# Patient Record
Sex: MALE | Race: WHITE | NOT HISPANIC OR LATINO | Employment: OTHER | URBAN - METROPOLITAN AREA
[De-identification: names, ages, dates, MRNs, and addresses within clinical notes are randomized per-mention and may not be internally consistent; named-entity substitution may affect disease eponyms.]

---

## 2018-01-02 ENCOUNTER — RECORDS - HEALTHEAST (OUTPATIENT)
Dept: LAB | Facility: CLINIC | Age: 63
End: 2018-01-02

## 2018-01-02 LAB
ANION GAP SERPL CALCULATED.3IONS-SCNC: 13 MMOL/L (ref 5–18)
BUN SERPL-MCNC: 61 MG/DL (ref 8–22)
CALCIUM SERPL-MCNC: 9.5 MG/DL (ref 8.5–10.5)
CHLORIDE BLD-SCNC: 100 MMOL/L (ref 98–107)
CO2 SERPL-SCNC: 21 MMOL/L (ref 22–31)
CREAT SERPL-MCNC: 2.08 MG/DL (ref 0.7–1.3)
GFR SERPL CREATININE-BSD FRML MDRD: 33 ML/MIN/1.73M2
GLUCOSE BLD-MCNC: 203 MG/DL (ref 70–125)
INR PPP: 2.09 (ref 0.9–1.1)
POTASSIUM BLD-SCNC: 4.4 MMOL/L (ref 3.5–5)
SODIUM SERPL-SCNC: 134 MMOL/L (ref 136–145)

## 2018-01-04 ENCOUNTER — RECORDS - HEALTHEAST (OUTPATIENT)
Dept: LAB | Facility: CLINIC | Age: 63
End: 2018-01-04

## 2018-01-04 LAB — INR PPP: 2.39 (ref 0.9–1.1)

## 2018-01-05 ENCOUNTER — RECORDS - HEALTHEAST (OUTPATIENT)
Dept: LAB | Facility: CLINIC | Age: 63
End: 2018-01-05

## 2018-01-08 LAB — INR PPP: 2.76 (ref 0.9–1.1)

## 2021-11-13 ENCOUNTER — TRANSFERRED RECORDS (OUTPATIENT)
Dept: HEALTH INFORMATION MANAGEMENT | Facility: CLINIC | Age: 66
End: 2021-11-13

## 2021-11-13 ENCOUNTER — HOSPITAL ENCOUNTER (EMERGENCY)
Facility: CLINIC | Age: 66
Discharge: HOME | End: 2021-11-13
Attending: EMERGENCY MEDICINE | Admitting: EMERGENCY MEDICINE
Payer: COMMERCIAL

## 2021-11-13 VITALS
SYSTOLIC BLOOD PRESSURE: 133 MMHG | DIASTOLIC BLOOD PRESSURE: 76 MMHG | OXYGEN SATURATION: 96 % | TEMPERATURE: 97.9 F | RESPIRATION RATE: 18 BRPM | HEART RATE: 87 BPM

## 2021-11-13 DIAGNOSIS — H43.11 VITREOUS HEMORRHAGE OF RIGHT EYE (H): ICD-10-CM

## 2021-11-13 PROCEDURE — 99283 EMERGENCY DEPT VISIT LOW MDM: CPT | Performed by: EMERGENCY MEDICINE

## 2021-11-13 PROCEDURE — 99282 EMERGENCY DEPT VISIT SF MDM: CPT | Performed by: EMERGENCY MEDICINE

## 2021-11-14 NOTE — CONSULTS
"  OPHTHALMOLOGY CONSULT NOTE  11/13/21    Patient: Nicholas Sultana      ASSESSMENT/PLAN:     Nicholas Sultana is a 66 year old male who presented with new onset decreased vision right eye:     Vitreous hemorrhage, right eye   History of Proliferative Diabetic Retinopathy s/p (likely) PRP each eye   H/o vitreous hemorrhage, left eye   - Sudden onset floaters Thursday morning  - VA HM right eye; baseline was likely around 20/40 as patient reported being able to drive up to a year ago.   - B scan confirmed vitreous hemorrhage  - Currently on ASA 81 mg, no other blood thinners  - Recommend upright head position to allow hemorrhage to settle  - follow-up with retina clinic 8 am PWB for evaluation of possible vitrectomy. Okay to be seen in acute clinic as well. Given that he is blind in both eyes essentially, he would benefit from more prompt surgery if possible.     Blind, non painful eye, left  - pressure unreadable today  - Continue to monitor  - Discussed importance of regular ophthalmic follow up    It is our pleasure to participate in this patient's care and treatment. Please contact us with any further questions or concerns.    Discussed with Dr. Kelly Baker who agreed with this assessment and plan.     Gabi Lopez MD     HISTORY OF PRESENTING ILLNESS:     Nicholas Sultana is a 66 year old male with POH of proliferative diabetic retinopathy (last A1c 7.6% not currently on insulin) who presented on 11/13/21  with sudden onset floaters in the right eye.    Started on Thursday. Developed multiple floaters that were dark black/purple in color. Feels this was similar to an episode of vitreous hemorrhage in the left eye. History of \"laser surgery\" in both eyes in 2014 by retina specialist in [place] (doesn't recall name). Has not seen eye doctor since then. Retina specialist had apparently recommended cataract surgery in left eye but patient did not go through with it due to extenuating family circumstances " "(wife with bone cancer, subsequently ).     Vision at baseline in the right eye used to be good enough for him to drive up till last year. Vision in left eye has been NLP for a long time.     Lives at home with nephew. Nephew is able to take care of him. He is nephew's legal guardian (nephew is 29, requires guardian for his OCD per patient).       10+ review of systems were otherwise negative except for that which has been stated above.      OCULAR/MEDICAL/SURGICAL HISTORIES:     Past Ocular History:   Refractive error: unknown  Prior eye surgery/laser: \" laser in both eyes\"  CTL wearer: no  GTTs: no    Has not had eye exam since .    Past Medical History:  As below, history of likely proliferative diabeic retinopathy    Past Medical History:   Diagnosis Date     Chronic ulcer of other specified site     right foot     Tobacco use disorder      Type II or unspecified type diabetes mellitus without mention of complication, not stated as uncontrolled      Unspecified chronic bronchitis (H)     recurrant     Unspecified essential hypertension        Past Surgical History:   Procedure Laterality Date     C EXC PRES ULCER UNLISTED      I&D of ulcer        Family History:  Non contributory    Social History:  Lives with nephew    EXAMINATION:     Base Eye Exam     Visual Acuity       Right Left    Dist sc  NLP          Pupils       APD    Right     Left non reactive no APD by reverse          Visual Fields       Left Right    Restrictions Total superior temporal, inferior temporal, superior nasal, inferior nasal deficiencies           Neuro/Psych     Oriented x3: Yes            Slit Lamp and Fundus Exam     External Exam       Right Left    External  sensory XT          Slit Lamp Exam       Right Left    Lids/Lashes dermatochalasis  dermatochalasis    Conjunctiva/Sclera White and quiet White and quiet    Cornea Clear, no stromal opacities significant KNV 11'clock, 7oclock,    Anterior Chamber Deep and quiet " Layered Hyphema, shallow AC    Iris Round and reactive, no NVI  2-3+ NVI    Lens 2-3+ NS pupillary membrane obscuring view    Vitreous vitreous hemorrhage unable           Fundus Exam       Right Left    Disc unable                  Labs/Studies/Imaging Performed  B scan revealed vitreous hemorrhage right eye        Gabi Lopez MD  Resident Physician, PGY-2  Department of Ophthalmology  11/13/21 10:59 PM

## 2021-11-14 NOTE — DISCHARGE INSTRUCTIONS
Follow-up at 8 AM in the ophthalmology clinic on the ninth floor of the [facility].    Return to the emergency department for any problems.

## 2021-11-14 NOTE — ED NOTES
Writer spoke with pt's nephew, Dane, to discuss pt's transportation home. Nephew reports he doesn't have away to  pt, writer informed nephew pt can be transported via EMS. Writer confirmed address.

## 2021-11-15 ENCOUNTER — TELEPHONE (OUTPATIENT)
Dept: OPHTHALMOLOGY | Facility: CLINIC | Age: 66
End: 2021-11-15
Payer: COMMERCIAL

## 2021-11-15 ENCOUNTER — PATIENT OUTREACH (OUTPATIENT)
Dept: CARE COORDINATION | Facility: CLINIC | Age: 66
End: 2021-11-15
Payer: COMMERCIAL

## 2021-11-15 NOTE — TELEPHONE ENCOUNTER
This is the third phone call to this pt and his nephew Nathaniel about his upcoming tomorrow for 11/16 @ 2 pm. Nicholas and Nathaniel are very confused about the appt and surgery. I have tried to explain many times what the appt is for. They are admit about surgery needing to be done right away. I can over hear the nephew yelling at Nicholsa for being stupid for not going to [facility] right now.      Nicholas / Nathaniel are very confused about his appts and they need clarification. They just have surgery stuck in their minds and tomorrow appt with Dr. Bautista is not needed.     Soraida Jules Communication Facilitator on 11/15/2021 at 4:19 PM

## 2021-11-15 NOTE — TELEPHONE ENCOUNTER
[facility] Call Center    Phone Message    May a detailed message be left on voicemail: yes     Reason for Call: Other:   Pt states that he has an ED follow-up this morning at 8am. Please note that Pt will be running a little late but is on his way right now.    Action Taken: Other:  eye      Travel Screening: Not Applicable

## 2021-11-15 NOTE — PROGRESS NOTES
Social Work Intervention  [facility]    Data/Intervention:    Patient Name:  Nicholas Sultana  /Age:  1955 (66 year old)    Visit Type: telephone  Referral Source: Indu Paulino   Reason for Referral: Transportation    Collaborated With:    - Ricky Palacio  - Indu Quigley     Psychosocial Information/Concerns:  Patient missed appointment this morning due to cab refusing to assist patient in and out of vehicle. Per report, patient is blind and did not feel comfortable taking cab without assistance.     Intervention/Education/Resources Provided:  Spoke with patient and nephDane wilson. Patient stated he does not have transportation through his insurance or via family. Provided education on [employer] transportation services and offered contact information to arrange a ride. Dane recorded contact information for [employer] [phone] and [employer] [phone]. Dane expressed he would help patient arrange transportation for appointment on 2021. Dane asked what time appointment is scheduled. Per chart review, no appointment scheduled at this time. Updated Indu Quigley whom expressed they would contact patient to confirm date and time of upcoming appointment.     Assessment/Plan:  No further needs identified at this time. Provided patient/family with contact information/availability and encouraged them to reach out with any needs that may arise.     Chetna Hines [facility]   [facility]   Phone: [phone]  Pager: [phone]

## 2021-11-15 NOTE — TELEPHONE ENCOUNTER
[facility] PHARMACY REFILL CONSULTATION     Situation  Writer is following up with patient for 90-day assessment of Xtandi prescription.      Background of Drug Therapy (include start date)  Patient has been receiving Xtandi from [facility] since 2016 for treatment of prostate cancer.  Patient actually was getting the medication for the past year from [facility] free drug program but now he has returned to [facility] for the medications.      Assessment Questionnaires    A. What changes do you have on your medication regimen (ie. new medications, dose changes etc)?  none  • Does change result in:  i.           New drug-drug interactions?  No  ii. New drug-food interactions?  No  iii. Therapy modifications?  No          Medication reconciliation completed today? No, will need in the next 90 days    B. What kind of side effects are you experiencing?  Patient is tolerating medication well    C. How do you feel this medication is working for you? What did the doctor tell you about the effectiveness of this medication?   Patient is stable though MD is monitoring for progression    D. How many doses have you missed since your last refill?   None- patient had a large backstock of medication and has been taking it daily    E. Education Materials or supplies given today   Drug specific information:        Recommendations and pharmacist's care plan including follow up (including clinic contact instructions)  1. Recommend to continue current medication as prescribed. Patient is not having any side effects, is adherent, and is stable on therapy.  2. Desires/movitation of the patient: to continue therapy as long as MD decides to  3. Problems/needs identified from LAST assessment: none  a.   Strategies/Interventions implemented to address problems/needs: n/a  b.   Progress towards treatment goals: n/a  4. NEW problems and/or needs identified upon today's assessment: nonen  a.   Strategies to address  [facility] Call Center    Phone Message    May a detailed message be left on voicemail: yes     Reason for Call: Other: Pt's jen Vela called in because they believed pt had surgery scheduled for tomorrow, for an ED f/u. Writer explained upcoming Appt with Retinal specialist and pt disagreed and was adamant that he was scheduled for surgery tomorrow at 2:00 pm. Pt's nephew Dane would like a call back to explain the schedule change so that he can explain it to the pt. Thank you.     Action Taken: Message routed to:  [facility] EYE    Travel Screening: Not Applicable                                                                         problems and/or needs: n/a  b.   Measurable goals and timeframe for each: n/a  5. No changes to the goals of care:  a.   Ensure adherence  b.   Minimize side effects  c.   Maximize patient’s response to therapy  6. Patient was counseled on proper use of additional supplies provided, if any, as detailed above to help with common side effects and promote adherence.  7. Resources available to implement this care plan: verbal education by pharmacist, written education materials, pharmacist follow-up assessments.   8. Patient’s medical records (EPIC SmartChart) will be reviewed once every month or every cycle, whichever is less, to evaluate refill appropriateness and make interventions based on changes in their profile (ie. new medications, recent lab results etc).  9. An [employer] caregiver will follow up for refill delivery in 4 weeks, and determine treatment compliance and presence of adverse effects to treatment.  10. [facility] 90-day assessment due in may 2018 to evaluate patient’s response to therapy, side effects, changes in their medications/allergies, drug interactions and adherence.  11. Medication will be shipped to [facility] via [employer] and estimated delivery on 3.6.18.  12. Patient has [employer] phone number, [phone], for questions and concerns.   13. Patient acknowledges and agrees with plan of care.     Mik RivasD.  Specialty Pharmacy Coordinator  [facility]

## 2021-11-15 NOTE — TELEPHONE ENCOUNTER
Called and Spoke to Nicholas about canceling his appt for today. Nicholas had no appt schedule for today; per pt it was scheduled in the ED. As I was scheduling Nicholas's appt for Wed he stated the two drs in the ED was going to do surgery today and that is why he was being seen today. I spoke to Dr. Diaz our Resident to see if he knew anything about Nicholas. Dr. Diaz did not know about Nicholas's appt or eye condition. Nicholas's notes from the ED don't state surgery or he needed a F/U appt. Also, our triage pool never received a follow up request from the ED doctors.      I paged Dr. Ashley to gather more information about this pt.  As for now pt is scheduled with Dr. Diaz for 11/17.     Soraida Jules Communication Facilitator on 11/15/2021 at 9:03 AM

## 2021-11-15 NOTE — TELEPHONE ENCOUNTER
[facility] Call Center    Phone Message    May a detailed message be left on voicemail: yes     Reason for Call: Other:   Pt states that the cab people won't help him into and out of the car or into the building so pt decided not to get into the cab. Pt states that he is completely blind and has noone else to help him. Pt needs to reschedule ED follow-up appt.     Pt would like to know if clinic can do anything for pt in terms of arranging transportation. Pt states that his insurance company is not able to help with this either.     Please advise.     Action Taken: Other:   eye     Travel Screening: Not Applicable

## 2021-11-16 ENCOUNTER — OFFICE VISIT (OUTPATIENT)
Dept: OPHTHALMOLOGY | Facility: CLINIC | Age: 66
End: 2021-11-16
Attending: OPHTHALMOLOGY
Payer: COMMERCIAL

## 2021-11-16 DIAGNOSIS — E11.3593 PROLIFERATIVE DIABETIC RETINOPATHY OF BOTH EYES ASSOCIATED WITH TYPE 2 DIABETES MELLITUS, MACULAR EDEMA PRESENCE UNSPECIFIED (H): Primary | ICD-10-CM

## 2021-11-16 DIAGNOSIS — H26.493 OTHER SECONDARY CATARACT OF BOTH EYES: ICD-10-CM

## 2021-11-16 DIAGNOSIS — H43.11 VITREOUS HEMORRHAGE OF RIGHT EYE (H): Primary | ICD-10-CM

## 2021-11-16 DIAGNOSIS — H43.11 VITREOUS HEMORRHAGE OF RIGHT EYE (H): ICD-10-CM

## 2021-11-16 DIAGNOSIS — H40.52X3 NVG (NEOVASCULAR GLAUCOMA), LEFT, SEVERE STAGE: ICD-10-CM

## 2021-11-16 LAB — SARS-COV-2 RNA RESP QL NAA+PROBE: NEGATIVE

## 2021-11-16 PROCEDURE — 76512 OPH US DX B-SCAN: CPT | Performed by: OPHTHALMOLOGY

## 2021-11-16 PROCEDURE — 67028 INJECTION EYE DRUG: CPT | Mod: RT | Performed by: OPHTHALMOLOGY

## 2021-11-16 PROCEDURE — U0005 INFEC AGEN DETEC AMPLI PROBE: HCPCS | Performed by: OPHTHALMOLOGY

## 2021-11-16 PROCEDURE — 250N000011 HC RX IP 250 OP 636: Performed by: OPHTHALMOLOGY

## 2021-11-16 PROCEDURE — 99204 OFFICE O/P NEW MOD 45 MIN: CPT | Mod: 25 | Performed by: OPHTHALMOLOGY

## 2021-11-16 PROCEDURE — G0463 HOSPITAL OUTPT CLINIC VISIT: HCPCS | Mod: 25

## 2021-11-16 RX ADMIN — Medication 1.25 MG: at 16:17

## 2021-11-16 ASSESSMENT — TONOMETRY
OD_IOP_MMHG: 10
OS_IOP_MMHG: 42
IOP_METHOD: TONOPEN

## 2021-11-16 ASSESSMENT — CONF VISUAL FIELD
OD_SUPERIOR_TEMPORAL_RESTRICTION: 1
OS_SUPERIOR_NASAL_RESTRICTION: 1
OS_INFERIOR_TEMPORAL_RESTRICTION: 1
OD_SUPERIOR_NASAL_RESTRICTION: 1
OS_SUPERIOR_TEMPORAL_RESTRICTION: 1
OS_INFERIOR_NASAL_RESTRICTION: 1
OD_INFERIOR_TEMPORAL_RESTRICTION: 1
OD_INFERIOR_NASAL_RESTRICTION: 1

## 2021-11-16 ASSESSMENT — SLIT LAMP EXAM - LIDS
COMMENTS: NORMAL
COMMENTS: NORMAL

## 2021-11-16 ASSESSMENT — EXTERNAL EXAM - RIGHT EYE: OD_EXAM: NORMAL

## 2021-11-16 ASSESSMENT — VISUAL ACUITY
OD_SC: HM
METHOD: SNELLEN - LINEAR
OS_SC: NLP

## 2021-11-16 ASSESSMENT — EXTERNAL EXAM - LEFT EYE: OS_EXAM: SENSORY XT

## 2021-11-16 NOTE — ED PROVIDER NOTES
ED Provider Note  [facility]      History     Chief Complaint   Patient presents with     Eye Injury     detached retina     HPI  Nicholas Sultana is a 66 year old male who is blind in his left eye and was sent to the emergency department for evaluation by ophthalmology for visual loss in right eye.  He was seen in outside emergency department where concern was raised for retinal detachment or vitreous hemorrhage.  Patient denies pain in his eye or recent trauma.    Past Medical History  Past Medical History:   Diagnosis Date     Chronic ulcer of other specified site     right foot     Tobacco use disorder      Type II or unspecified type diabetes mellitus without mention of complication, not stated as uncontrolled      Unspecified chronic bronchitis (H)     recurrant     Unspecified essential hypertension      Past Surgical History:   Procedure Laterality Date     C EXC PRES ULCER UNLISTED      I&D of ulcer      ASPIRIN 81 MG OR TABS  GLUCOPHAGE 500 MG OR TABS  GLUCOTROL XL# 10 MG OR TB24  LISINOPRIL 40 MG OR TABS  ONE TOUCH ULTRA TEST VI STRP  SIMVASTATIN 40 MG OR TABS  VIAGRA 100 MG OR TABS      Allergies   Allergen Reactions     Other Drug Allergy (See Comments) Muscle Pain (Myalgia)     Statins per pt     Family History  History reviewed. No pertinent family history.  Social History   Social History     Tobacco Use     Smoking status: Former Smoker     Packs/day: 1.50     Years: 15.00     Pack years: 22.50     Types: Cigarettes     Quit date: 2013     Years since quittin.8     Smokeless tobacco: Never Used   Substance Use Topics     Alcohol use: Not Currently     Drug use: No      Past medical history, past surgical history, medications, allergies, family history, and social history were reviewed with the patient. No additional pertinent items.       Review of Systems  A complete review of systems was performed with pertinent positives and negatives noted in the HPI,  and all other systems negative.    Physical Exam   BP: 136/78  Pulse: 87  Temp: 97.9  F (36.6  C)  Resp: 18  SpO2: 97 %  Physical Exam  Vitals and nursing note reviewed.   Constitutional:       General: He is not in acute distress.     Appearance: He is well-developed. He is not ill-appearing or toxic-appearing.   HENT:      Head: Normocephalic and atraumatic.   Eyes:      General: Lids are normal. No scleral icterus.     Extraocular Movements:      Right eye: Normal extraocular motion.      Left eye: Normal extraocular motion.      Comments: Detailed eye exam deferred to ophthalmology consult   Cardiovascular:      Rate and Rhythm: Normal rate.   Pulmonary:      Effort: Pulmonary effort is normal. No respiratory distress.   Musculoskeletal:      Cervical back: Normal range of motion and neck supple.   Skin:     General: Skin is warm and dry.      Coloration: Skin is not pale.      Findings: No rash.   Neurological:      Mental Status: He is alert and oriented to person, place, and time.      Sensory: No sensory deficit.   Psychiatric:         Behavior: Behavior normal.         ED Course      Procedures              No results found for any visits on 11/13/21.  Medications - No data to display     Assessments & Plan (with Medical Decision Making)     This patient was sent to the emergency department to be evaluated by ophthalmology.  He was seen by ophthalmology and diagnosed with vitreous hemorrhage of the right eye.  Will be seen in follow-up in the ophthalmology clinic tomorrow for further evaluation at 8 AM.  Patient was transported home in unchanged and stable condition.    I have reviewed the nursing notes. I have reviewed the findings, diagnosis, plan and need for follow up with the patient.    Discharge Medication List as of 11/13/2021 10:10 PM          Final diagnoses:   Vitreous hemorrhage of right eye (H)       --    [facility] EMERGENCY DEPARTMENT  11/13/2021     Prasad Devine,  MD  11/16/21 5036

## 2021-11-16 NOTE — NURSING NOTE
Chief Complaints and History of Present Illnesses   Patient presents with     Vitreous Hemorrhage Evaluation     Chief Complaint(s) and History of Present Illness(es)     Vitreous Hemorrhage Evaluation     Laterality: right eye              Comments     Pt. States that he woke up a few days ago and VA RE was very blurry. Can only see shadows RE. LE blind for many years due to diabetic problems. No pain BE.   Tiffany Alexander COT 1:39 PM November 16, 2021

## 2021-11-16 NOTE — PROGRESS NOTES
"CC -   VH OD    INTERVAL HISTORY - Initial visit with me.  No change since seen on weekend.  Great difficulty with ADLs after vision loss OD    PMH -   Nicholas [address] is a  66 year old year-old patient with h/o DM II and PDR OU.  Presented to [facility] 11/13/2021 with new VH OD    Lost vision OS after \"bleeding\". Was advised to have surgery OS in 2014 for NCVH but did not, subsequent vision loss to NLP unsure when.  Had PRP OU in 2014, but did not get eye exam from 2014 until new VH OD in 11/2021  He reports a hx of open bypass surgery in 2016.       PAST OCULAR SURGERY  PRP (laser per patient) OU 2014    RETINAL IMAGING:  U/S B-scan 11-16-21  OD - VH with dense membrane on ONH/nasal post pole, VRT to ONH and membrane, retina attached  OS - membranes and ?VH, no RD/CD/mass      ASSESSMENT & PLAN    # PDR with DM II OU   - s/p PRP OU 2014   - NLP OS ?d/t NVG   - NCVH OD 11/2021   - unclear if quiescent or active      # VH OD   - onset 11/11/21 by history   - monocular, in better eye   - severely affecting ADLs and self-care   - on ASA and coumadin   - HOB elevated       - will need PPV/MS/EL/ possible SO/possible PPL   - may need PPL but less likely   - SO d/t monocular     - Avastin today   - will plan surgery this Thursday if possible   - given general health comorbidities may need cardiac clearance      - r/b/a d/w patient: vision loss, blindness, infection, bleeding   - retinal detachment, need for more surgeries, need for gas or oil bubble and bubble restrictions   - cataract   - persistent blurriness, distortion, or scotoma   - participation of fellow or resident     - r/b/a IVT anti-VEGF d/w patient   - infection, blindness, need for surgery   - off-label use of Avastin      # NVG OS   - NLP   - no pain    # NS OU   - moderate OD   - brunescent OS      return to clinic: for post-op      Ray Combs MD - PGY3  Department of Ophthalmology  Pager: [phone]    ATTESTATION     Attending Physician Attestation: "      Complete documentation of historical and exam elements from today's encounter can be found in the full encounter summary report (not reduplicated in this progress note).  I personally obtained the chief complaint(s) and history of present illness.  I confirmed and edited as necessary the review of systems, past medical/surgical history, family history, social history, and examination findings as documented by others; and I examined the patient myself.  I personally reviewed the relevant tests, images, and reports as documented above.  I personally reviewed the ophthalmic test(s) associated with this encounter, agree with the interpretation(s) as documented by the resident/fellow, and have edited the corresponding report(s) as necessary.   I formulated and edited as necessary the assessment and plan and discussed the findings and management plan with the patient and family and No resident or fellow assisted with the procedures performed.  I performed the procedures myself.    Nidhi Concepcion MD, PhD  , Vitreoretinal Surgery  Department of Ophthalmology  [facility]

## 2021-11-17 ENCOUNTER — PATIENT OUTREACH (OUTPATIENT)
Dept: CARE COORDINATION | Facility: CLINIC | Age: 66
End: 2021-11-17
Payer: COMMERCIAL

## 2021-11-17 ENCOUNTER — PATIENT OUTREACH (OUTPATIENT)
Dept: CARE COORDINATION | Facility: CLINIC | Age: 66
End: 2021-11-17

## 2021-11-18 ENCOUNTER — TELEPHONE (OUTPATIENT)
Dept: OPHTHALMOLOGY | Facility: CLINIC | Age: 66
End: 2021-11-18
Payer: COMMERCIAL

## 2021-11-18 ENCOUNTER — PATIENT OUTREACH (OUTPATIENT)
Dept: CARE COORDINATION | Facility: CLINIC | Age: 66
End: 2021-11-18
Payer: COMMERCIAL

## 2021-11-18 DIAGNOSIS — Z11.59 ENCOUNTER FOR SCREENING FOR OTHER VIRAL DISEASES: ICD-10-CM

## 2021-11-18 NOTE — TELEPHONE ENCOUNTER
Called and introduced myself to Nicholas and offered to help set up a pre-op physical early next week to be able to get him into surgery next Thursday with Dr. Concepcion.     Nicholas is concerned about having enough time to arrange a ride. I asked him if he thought an appointment early next week would allow him enough time to arrange a ride and he agreed it would. Nicholas gave me permission to reach out to his Primary care and schedule a pre-op physical.    I told Nicholas I would call him back once I have something scheduled.

## 2021-11-18 NOTE — PROGRESS NOTES
Social Work Follow-Up  [facility]    Data/Intervention:  Patient Name:  Nicholas Sultana  /Age:  1955 (66 year old)    Reason for Follow-Up:  Transportation    Collaborated With:    - jenn Palacio   - Lucy, Perioperative     Intervention/Education/Resources Provided:  Called patient and Dane to follow up on SW referral from yesterday afternoon. Patient reports he did not have enough time to arrange transportation for today's procedure and asked for it to be rescheduled. Spoke with Lucy to provide update and requested patient be given at least 24 hour notice for future appointments so he can arrange transportation.    Assessment/Plan:  Lucy plans to call patient to reschedule procedure. Patient plans to schedule rides once procedure date/time are set. Previously provided patient/family with writer's contact information/availability and encouraged them to reach out with any needs that may arise.     Chetna Hines [facility] [place]   [facility]   Phone: [phone]  Pager: [phone]

## 2021-11-18 NOTE — TELEPHONE ENCOUNTER
Called Nicholas to let him know the time of 1 day Post op with Dr. Rodriguez and the guidance Dr. Concepcion provided on holding Coumadin which is to defer to PCP and speak to Home RN tomorrow.

## 2021-11-18 NOTE — TELEPHONE ENCOUNTER
Per message from .    Patient wants to cancel his procedure. Patient states he needs time to arrange his transportation as well as be able to schedule a pre-op physical with his PCP.    Patient has been removed from the schedule.    Patient will be called to reschedule.    Lucy Alexander  Perioperative   Ophthalmology/Oculoplastics  [phone]

## 2021-11-18 NOTE — PROGRESS NOTES
Social Work Telephone  Note  [facility]     Patient Name:  Nicholas Sultana  /Age:  1955 (66 year old)    Referral Source: opth  Reason for Referral:  transportation     contacted Patient's nephew, Nathaniel via telephone on 21 regarding concerns with transportation for an appointment scheduled for his uncle/patient Nicholas.  Writer spoke with Nathaniel and reiterated the contact information for transportation that was given to them by colleague  this past Monday and encouraged them to schedule transportation.  Nathaniel confirmed that they had the contact information and was going to talk with Nicholas about his options.  Requested that they inform the clinic if they aren't planning to come in hopes of giving that appointment to another patient.  Sw will continue to assist as needed.               JORDAN Smith, [facility]    [facility]  [phone]  [email or web address]

## 2021-11-18 NOTE — PROGRESS NOTES
Social Work Intervention  [facility]    Data/Intervention:    Patient Name:  Nicholas Sultana  /Age:  1955 (66 year old)    Visit Type: telephone  Referral Source: Opthalmology  Reason for Referral:  Transportation to Procedure on 2021    Collaborated With:    -Lucy    -Patient  -Patient's NephewDane     Psychosocial Information/Concerns:  ROLO covering for DARELL Basilio. Chetna received a VM from Lucy , who reached out regarding transportation assistance for patient to their procedure tomorrow on 2021.     Intervention/Education/Resources Provided:  ROLO called patient, introduced self and explained the reason for calling. Patient expressed feeling frustrated as he was just notified 10 minutes ago that a procedure was scheduled for tomorrow. Patient expressed not having enough time to call and arrange door through door transportation. SW validated patient's frustrations and apologized about being notified last minute. Patient also expressed wanting to reschedule so that they can arrange for transportation. ROLO also spoke with patient's nephew, who explained that patient is needing to arrive to the clinic around 9 am and is expected to be done around 2 pm. Dane is unable to go with patient for their procedure and patient is unable to ride in a normal cab due to being blind and not feeling comfortable taking a normal cab without assistance. Due to it almost being 5 pm ROLO explained that it might be difficult to get a hold of transportation companies. ROLO informed both Dane and patient that they will follow up with primary SW, Chetna, and update her on what is going on and see if there is anywhere they can arrange a ride for the procedure. Otherwise, we will have to look at rescheduling the procedure. SW will have DARELL Basilio, give patient a call tomorrow morning. Both patient and Dane verbalized understanding. ROLO also called [facility] and updated  them as well.     Assessment/Plan:  SW will touch base and update DARELL Basilio, in the morning and have them connect with patient.    JORDAN Palmer,DARELL  Hematology/Oncology Social Worker  Phone:[phone] Pager: [phone]

## 2021-11-18 NOTE — TELEPHONE ENCOUNTER
Patient is scheduled for surgery with Dr. Nidhi Concepcion     Spoke with: Eze     Date of Surgery: 11/26     Location: [facility], [facility]:  [address] [place] [id number]     Informed patient they will need an adult : Yes     H&P will be completed at: PAC clinic 11/23     COVID testing: [facility] 11/23    Post Op scheduled on 11/27 and 12/03     Surgery packet was mailed 11/18     Additional comments: Advised RN will call 1 - 2 business days prior with arrival time and instructions.

## 2021-11-19 NOTE — TELEPHONE ENCOUNTER
FUTURE VISIT INFORMATION      SURGERY INFORMATION:    Date: 21    Location: [facility] or    Surgeon:  Nidhi Concepcion MD    Anesthesia Type:  Combined MAC with Retrobulbar    Procedure: Right eye: vitrectomy, membrane stripping, laser , possible bubble    RECORDS REQUESTED FROM:       Primary Care Provider: Basilio Pacheco MD- [facility]    Pertinent Medical History: hypertension    Most recent EKG+ Tracin21- [facility]    Most recent ECHO: 9/15/16- [facility]    Most recent Cardiac Stress Test: 16- [facility]    Most recent PFT's: 16-[facility]

## 2021-11-22 ENCOUNTER — TELEPHONE (OUTPATIENT)
Dept: OPHTHALMOLOGY | Facility: CLINIC | Age: 66
End: 2021-11-22
Payer: COMMERCIAL

## 2021-11-22 NOTE — TELEPHONE ENCOUNTER
Nathaniel called to get an idea of how long surgery will be to schedule their  ride. We also reviewed Post-op scheduled for Saturday morning so he could schedule those rides as well.

## 2021-11-23 ENCOUNTER — OFFICE VISIT (OUTPATIENT)
Dept: SURGERY | Facility: CLINIC | Age: 66
End: 2021-11-23
Payer: COMMERCIAL

## 2021-11-23 ENCOUNTER — LAB (OUTPATIENT)
Dept: LAB | Facility: CLINIC | Age: 66
End: 2021-11-23

## 2021-11-23 ENCOUNTER — LAB (OUTPATIENT)
Dept: LAB | Facility: CLINIC | Age: 66
End: 2021-11-23
Attending: OPHTHALMOLOGY
Payer: COMMERCIAL

## 2021-11-23 ENCOUNTER — ANESTHESIA EVENT (OUTPATIENT)
Dept: SURGERY | Facility: CLINIC | Age: 66
End: 2021-11-23

## 2021-11-23 ENCOUNTER — PRE VISIT (OUTPATIENT)
Dept: SURGERY | Facility: CLINIC | Age: 66
End: 2021-11-23

## 2021-11-23 VITALS
DIASTOLIC BLOOD PRESSURE: 64 MMHG | SYSTOLIC BLOOD PRESSURE: 134 MMHG | TEMPERATURE: 97.9 F | RESPIRATION RATE: 20 BRPM | BODY MASS INDEX: 25.77 KG/M2 | OXYGEN SATURATION: 92 % | HEIGHT: 70 IN | HEART RATE: 70 BPM | WEIGHT: 180 LBS

## 2021-11-23 DIAGNOSIS — Z01.818 PREOP EXAMINATION: Primary | ICD-10-CM

## 2021-11-23 DIAGNOSIS — E11.3511 PROLIFERATIVE DIABETIC RETINOPATHY OF RIGHT EYE WITH MACULAR EDEMA ASSOCIATED WITH TYPE 2 DIABETES MELLITUS (H): ICD-10-CM

## 2021-11-23 DIAGNOSIS — I50.32 CHRONIC DIASTOLIC HEART FAILURE (H): ICD-10-CM

## 2021-11-23 DIAGNOSIS — H43.11 VITREOUS HEMORRHAGE, RIGHT EYE (H): ICD-10-CM

## 2021-11-23 DIAGNOSIS — Z01.818 PREOP EXAMINATION: ICD-10-CM

## 2021-11-23 DIAGNOSIS — Z11.59 ENCOUNTER FOR SCREENING FOR OTHER VIRAL DISEASES: ICD-10-CM

## 2021-11-23 LAB
ANION GAP SERPL CALCULATED.3IONS-SCNC: 10 MMOL/L (ref 3–14)
BUN SERPL-MCNC: 108 MG/DL (ref 7–30)
CALCIUM SERPL-MCNC: 9.7 MG/DL (ref 8.5–10.1)
CHLORIDE BLD-SCNC: 103 MMOL/L (ref 94–109)
CO2 SERPL-SCNC: 27 MMOL/L (ref 20–32)
CREAT SERPL-MCNC: 2.53 MG/DL (ref 0.66–1.25)
ERYTHROCYTE [DISTWIDTH] IN BLOOD BY AUTOMATED COUNT: 14.4 % (ref 10–15)
GFR SERPL CREATININE-BSD FRML MDRD: 25 ML/MIN/1.73M2
GLUCOSE BLD-MCNC: 231 MG/DL (ref 70–99)
HBA1C MFR BLD: 7.2 % (ref 0–5.6)
HCT VFR BLD AUTO: 40.3 % (ref 40–53)
HGB BLD-MCNC: 12.5 G/DL (ref 13.3–17.7)
INR PPP: 1.77 (ref 0.85–1.15)
MCH RBC QN AUTO: 30 PG (ref 26.5–33)
MCHC RBC AUTO-ENTMCNC: 31 G/DL (ref 31.5–36.5)
MCV RBC AUTO: 97 FL (ref 78–100)
PLATELET # BLD AUTO: 208 10E3/UL (ref 150–450)
POTASSIUM BLD-SCNC: 4.7 MMOL/L (ref 3.4–5.3)
RBC # BLD AUTO: 4.16 10E6/UL (ref 4.4–5.9)
SARS-COV-2 RNA RESP QL NAA+PROBE: NEGATIVE
SODIUM SERPL-SCNC: 140 MMOL/L (ref 133–144)
WBC # BLD AUTO: 10.6 10E3/UL (ref 4–11)

## 2021-11-23 PROCEDURE — 85027 COMPLETE CBC AUTOMATED: CPT | Performed by: PATHOLOGY

## 2021-11-23 PROCEDURE — 85610 PROTHROMBIN TIME: CPT | Performed by: PATHOLOGY

## 2021-11-23 PROCEDURE — 36415 COLL VENOUS BLD VENIPUNCTURE: CPT | Performed by: PATHOLOGY

## 2021-11-23 PROCEDURE — 99204 OFFICE O/P NEW MOD 45 MIN: CPT | Performed by: PHYSICIAN ASSISTANT

## 2021-11-23 PROCEDURE — U0005 INFEC AGEN DETEC AMPLI PROBE: HCPCS | Mod: 90 | Performed by: PATHOLOGY

## 2021-11-23 PROCEDURE — U0003 INFECTIOUS AGENT DETECTION BY NUCLEIC ACID (DNA OR RNA); SEVERE ACUTE RESPIRATORY SYNDROME CORONAVIRUS 2 (SARS-COV-2) (CORONAVIRUS DISEASE [COVID-19]), AMPLIFIED PROBE TECHNIQUE, MAKING USE OF HIGH THROUGHPUT TECHNOLOGIES AS DESCRIBED BY CMS-2020-01-R: HCPCS | Mod: 90 | Performed by: PATHOLOGY

## 2021-11-23 PROCEDURE — 83036 HEMOGLOBIN GLYCOSYLATED A1C: CPT | Performed by: PATHOLOGY

## 2021-11-23 PROCEDURE — 80048 BASIC METABOLIC PNL TOTAL CA: CPT | Performed by: PATHOLOGY

## 2021-11-23 PROCEDURE — 99000 SPECIMEN HANDLING OFFICE-LAB: CPT | Performed by: PATHOLOGY

## 2021-11-23 RX ORDER — TRIAMCINOLONE ACETONIDE 1 MG/G
OINTMENT TOPICAL
COMMUNITY
Start: 2021-10-25 | End: 2022-01-21

## 2021-11-23 RX ORDER — ALBUTEROL SULFATE 90 UG/1
1-2 AEROSOL, METERED RESPIRATORY (INHALATION) DAILY PRN
COMMUNITY
Start: 2021-09-09

## 2021-11-23 RX ORDER — CEPHALEXIN 500 MG/1
500 CAPSULE ORAL
COMMUNITY
Start: 2021-11-17 | End: 2021-11-27

## 2021-11-23 RX ORDER — ISOSORBIDE MONONITRATE 30 MG/1
30 TABLET, EXTENDED RELEASE ORAL EVERY MORNING
COMMUNITY
Start: 2021-02-18

## 2021-11-23 RX ORDER — ISOSORBIDE MONONITRATE 60 MG/1
1 TABLET, EXTENDED RELEASE ORAL EVERY MORNING
COMMUNITY
Start: 2021-02-18

## 2021-11-23 RX ORDER — ACETAMINOPHEN 500 MG
500-1000 TABLET ORAL EVERY 6 HOURS PRN
COMMUNITY

## 2021-11-23 RX ORDER — METOLAZONE 2.5 MG/1
2.5 TABLET ORAL DAILY PRN
COMMUNITY

## 2021-11-23 RX ORDER — LIDOCAINE 40 MG/G
CREAM TOPICAL
Status: CANCELLED | OUTPATIENT
Start: 2021-11-23

## 2021-11-23 RX ORDER — POTASSIUM CHLORIDE 750 MG/1
10 CAPSULE, EXTENDED RELEASE ORAL EVERY MORNING
COMMUNITY
Start: 2021-02-18

## 2021-11-23 RX ORDER — WARFARIN SODIUM 3 MG/1
TABLET ORAL
COMMUNITY
Start: 2021-11-22

## 2021-11-23 RX ORDER — METOPROLOL TARTRATE 50 MG
50 TABLET ORAL 2 TIMES DAILY
COMMUNITY
Start: 2021-11-19

## 2021-11-23 RX ORDER — SODIUM CHLORIDE, SODIUM LACTATE, POTASSIUM CHLORIDE, CALCIUM CHLORIDE 600; 310; 30; 20 MG/100ML; MG/100ML; MG/100ML; MG/100ML
INJECTION, SOLUTION INTRAVENOUS CONTINUOUS
Status: CANCELLED | OUTPATIENT
Start: 2021-11-23

## 2021-11-23 RX ORDER — BUMETANIDE 2 MG/1
4 TABLET ORAL EVERY MORNING
COMMUNITY
Start: 2021-02-18

## 2021-11-23 RX ORDER — FERROUS SULFATE 325(65) MG
325 TABLET ORAL 2 TIMES DAILY
COMMUNITY
Start: 2021-04-05

## 2021-11-23 ASSESSMENT — COPD QUESTIONNAIRES: COPD: 1

## 2021-11-23 ASSESSMENT — ENCOUNTER SYMPTOMS
DYSRHYTHMIAS: 1
SEIZURES: 0

## 2021-11-23 ASSESSMENT — MIFFLIN-ST. JEOR: SCORE: 1602.72

## 2021-11-23 ASSESSMENT — PAIN SCALES - GENERAL: PAINLEVEL: NO PAIN (0)

## 2021-11-23 NOTE — PATIENT INSTRUCTIONS
Preparing for Your Surgery      Name:  Nicholas Sultana   MRN:  [id number]   :  1955   Today's Date:  2021       Arriving for surgery:  Surgery date:  21  Arrival time:  05:30 a.m.    Restrictions due to COVID 19:       One visitor is allowed in the Pre Op area.       When you go into surgery, one visitor is allowed to wait in the Surgery Waiting Room       (provided there is enough space to social distance).         In hospital patients are allowed 1 visitor per day       The visitor may change daily     Visiting Hours: 8 am - 8:30 pm   No ill visitors.   All visitors must wear face mask.    [employer] parking is available for anyone with mobility limitations or disabilities.  ([place]  24 hours/ 7 days a week; [facility]  7 am- 3:30 pm, Mon- Fri)    Please come to:     [facility] [address]  [address] [place]  [address]    -[employer] parking is available in front of [facility] from 5:15AM to 8:00PM. If you prefer, park your car in the Green Lot.    -Proceed to the 3rd floor, check in at the Adult Surgery Waiting Lounge. [phone]    If an escort is needed stop at the Information Desk in the lobby. Inform the information person that you are here for surgery. An escort to the Adult Surgery Waiting Lounge will be provided.        What can I eat or drink?  -  You may eat and drink normally for up to 8 hours before your surgery. (Until 11:30 p.m.)  -  You may have clear liquids until 2 hours before surgery. (Until 05:30 a.m.)    Examples of clear liquids:  Water  Clear broth  Juices (apple, white grape, white cranberry  and cider) without pulp  Noncarbonated, powder based beverages  (lemonade and Royal-Aid)  Sodas (Sprite, 7-Up, ginger ale and seltzer)  Coffee or tea (without milk or cream)  Gatorade    -  No Alcohol for at least 24 hours before surgery     Which medicines can I take?      Hold Multivitamins for 7 days  before surgery.  Hold Supplements for 7 days before surgery.  Hold Ibuprofen (Advil, Motrin) for 1 day before surgery--unless otherwise directed by surgeon.  Hold Naproxen (Aleve) for 4 days before surgery.    HOLD WARFARIN (COUMADIN)STARTING 11/23,  LAST DOSE 11/22.    -  DO NOT take these medications the day of surgery:  Glucophage (Metformin),  Glucotrol XL (Glipizide XL),  Linagliptin (Tradjenta)    -  PLEASE TAKE these medications the day of surgery:  Tylenol if needed,  Albuterol inhaler if needed - bring to hospital,  81 mg Aspirin,  Bumetanide (Bumex),   Isosorbide mononitrate (Imdur), Lisinopril,  Simvastatin, Potassium Chloride ER,    metoprolol Tartrate (Lopressor)    How do I prepare myself?  - Please take 2 showers before surgery using Scrubcare or Hibiclens soap.    Use this soap only from the neck to your toes.     Leave the soap on your skin for one minute--then rinse thoroughly.      You may use your own shampoo and conditioner; no other hair products.   - Please remove all jewelry and body piercings.  - No lotions, deodorants or fragrance.  - No makeup or fingernail polish.   - Bring your ID and insurance card.    -If you have a Deep Brain Stimulator, Spinal Cord Stimulator or any neuro stimulator device---you must bring the remote control to the hospital     - All patients are required to have a Covid-19 test within 4 days of surgery/procedure.      -Patients will be contacted by the [facility] scheduling team within 1 week of surgery to make an appointment.      - Patients may call the Scheduling team at [phone] if they have not been scheduled within 4 days of  surgery.      ALL PATIENTS GOING HOME THE SAME DAY OF SURGERY ARE REQUIRED TO HAVE A RESPONSIBLE ADULT TO DRIVE AND BE IN ATTENDANCE WITH THEM FOR 24 HOURS FOLLOWING SURGERY.      Questions or Concerns:    - For any questions regarding the day of surgery or your hospital stay, please contact the Pre Admission Nursing Office at  [phone].       - If you have health changes between today and your surgery please call your surgeon.       For questions after surgery please call your surgeons office.

## 2021-11-23 NOTE — ANESTHESIA PREPROCEDURE EVALUATION
Anesthesia Pre-Procedure Evaluation    Patient: Nicholas Sultana   MRN: [id number] : 1955        Preoperative Diagnosis: * No surgery found *    Procedure :   PAC EVALUATION       Past Medical History:   Diagnosis Date     Chronic ulcer of other specified site     right foot     Tobacco use disorder      Type II or unspecified type diabetes mellitus without mention of complication, not stated as uncontrolled      Unspecified chronic bronchitis (H)     recurrant     Unspecified essential hypertension       Past Surgical History:   Procedure Laterality Date     C EXC PRES ULCER UNLISTED      I&D of ulcer       Allergies   Allergen Reactions     Other Drug Allergy (See Comments) Muscle Pain (Myalgia)     Statins per pt      Social History     Tobacco Use     Smoking status: Former Smoker     Packs/day: 1.50     Years: 15.00     Pack years: 22.50     Types: Cigarettes     Quit date: 2013     Years since quittin.8     Smokeless tobacco: Never Used   Substance Use Topics     Alcohol use: Not Currently      Wt Readings from Last 1 Encounters:   08 105.8 kg (233 lb 4 oz)        Anesthesia Evaluation   Pt has had prior anesthetic.     No history of anesthetic complications       ROS/MED HX  ENT/Pulmonary: Comment: Asbestos plaques    (+) sleep apnea, doesn't use CPAP, COPD,     Neurologic:  - neg neurologic ROS  (-) no seizures and no CVA   Cardiovascular:     (+) hypertension--CAD -CABG-date: 2016. -Taking blood thinners CHF etiology: chronic diastolic HF Last EF: 55-60% date:  dysrhythmias, a-fib, Previous cardiac testing   Echo: Date: 9/15/2016 Results:  Final Conclusion    Limited echo for EF.    Definity was used to enhance endocardial definition secondary to suboptimal image quality.    Normal left ventricular ejection fraction estimated at 55-60%.    Estimated RV systolic pressure of 58.4 mmHg + RA pressure.    No pericardial effusion.    An effusion (pericardial) was seen on the  previous echo.  No significant effusion is seen on   today's study.    Estimated EF: 55-60%    FINDINGS    Left Ventricle Normal left ventricular size and systolic function. Normal left ventricular   ejection fraction estimated at 55-60%.    Right Ventricle Right ventricle not well visualized.    Left Atrium Not applicable.    Right Atrium Not applicable.    IA Septum: Not applicable.    IVC Not applicable.    Aortic Valve Not applicable.    Mitral Valve Not applicable.    Tricuspid Valve Trace tricuspid regurgitation. Estimated RV systolic pressure of 58.4 mmHg +   RA pressure.    Pulmonic Valve Not applicable.    Aorta Not applicable.    Pericardium No pericardial effusion.     Stress Test: Date: Results:    ECG Reviewed: Date: 8/4/21 Results:  Normal sinus rhythm   Right superior axis deviation   Right ventricular hypertrophy   Prolonged QT (390/482)  Abnormal ECG   Cath: Date: Results:      METS/Exercise Tolerance:  Comment: <4   Hematologic:     (+) history of blood transfusion, no previous transfusion reaction,  (-) history of blood clots   Musculoskeletal: Comment: Partial right foot amputation    Gout right knee  (+) arthritis,     GI/Hepatic:    (-) GERD   Renal/Genitourinary: Comment: Creatinine 3.17, GFR 24 (11/13/21)    (+) renal disease, type: CRI, Pt does not require dialysis,     Endo: Comment: Hypoglycemic episode 8/2021    (+) type II DM, Last HgA1c: 7.6, date: 8/4/21, Not using insulin,  (-) thyroid disease   Psychiatric/Substance Use:  - neg psychiatric ROS     Infectious Disease:  - neg infectious disease ROS     Malignancy:  - neg malignancy ROS     Other:            Physical Exam    Airway             Respiratory Devices and Support         Dental       (+) upper dentures and lower dentures      Cardiovascular             Pulmonary                   OUTSIDE LABS:  CBC: No results found for: WBC, HGB, HCT, PLT  BMP:   Lab Results   Component Value Date     (L) 01/02/2018      12/12/2007    POTASSIUM 4.4 01/02/2018    POTASSIUM 4.6 12/12/2007    CHLORIDE 100 01/02/2018    CHLORIDE 101 12/12/2007    CO2 21 (L) 01/02/2018    CO2 28 12/12/2007    BUN 61 (H) 01/02/2018    BUN 12 12/12/2007    CR 2.08 (H) 01/02/2018    CR 0.93 12/12/2007     (H) 01/02/2018     (H) 12/12/2007     COAGS:   Lab Results   Component Value Date    INR 2.76 (H) 01/08/2018     POC: No results found for: BGM, HCG, HCGS  HEPATIC:   Lab Results   Component Value Date    ALBUMIN 3.9 12/12/2007    PROTTOTAL 7.1 12/12/2007    ALT 21 12/12/2007    AST 22 12/12/2007    ALKPHOS 110 12/12/2007    BILITOTAL 0.4 12/12/2007     OTHER:   Lab Results   Component Value Date    A1C 8.2 (H) 12/12/2007    ENA 9.5 01/02/2018             PAC Discussion and Assessment    ASA Classification: 3  Case is suitable for: [place]  Anesthetic techniques and relevant risks discussed: MAC with GA as backup  Invasive monitoring and risk discussed: No    Possibility and Risk of blood transfusion discussed: No            PAC Resident/NP Anesthesia Assessment: Nicholas Sultana is a 66 year old male scheduled for Right eye: vitrectomy, membrane stripping, laser, possible bubble on 11/26/21 by Dr. Concepcion in treatment of Vitreous hemorrhage of right eye, and proliferative diabetic retinopahy.  PAC referral for risk assessment and optimization for anesthesia with comorbid conditions of HTN, paroxysmal atrial fibrillation on warfarin, chronic diastolic heart failure, CAD s/p CABG x5 (9/2016), COPD, asbestos pleural plaques, YAS, NIDDM, obesity, CKD:    Pre-operative considerations:  1.  Cardiac:  Functional status- METS <4.  Low risk surgery with 11% (RCRI #) risk of major adverse cardiac event (CHF, h/o ischemic heart disease, creatinine >2).   --CAD s/p CABGx5 with Maze procedure and left appendage clip 9/2/2016.  Last visit with cardiology 2/18/21 with follow up one year planned with echo before.  Last echo 9/15/2016 with EF 55-60%,  "RSVP 58.4 + RAP  --paroxysmal atrial fibrillation on warfarin.    --today patient denies chest pain, chest pressure, palpitations, SOB, peripheral edema, PND, orthopnea.  Pt states he has no difficulty lying flat.  --HTN, continue lisinopril, metoprolol, Imdur  --Chronic diastolic heart failure, echo as above. Last EF 2016, 55-60%.  Continue diuretics as directed.  Euvolemic today.  Discussed option of pursuing echo with anesthesia.  Was not able to find echo appt prior to above surgery.  Would not delay surgery as pt does not have any new symptoms.  He is stable since his visit with cardiology 2/2021.    2.  Pulm:  Airway feasible.    --YAS does not use CPAP.  He does not think he needs it  --former smoker  --COPD, does not use inhalers because he \"doesn't need them\".  Saw pulmonology last 12/2016.  SpO2 today 92% on RA.  Lungs CTA.  --denies cough but states he occasionally produces phlegm.  Could consider preop nebulizer.    --per pulmonary note 12/2016:  Date ratio FEV1 FVC   April 06, 2016  52   0.95/28%  1.82/40%   December 07, 2016  58  1.22/36%  2.08/46%   --Asbestos-induced pleural plaque and pleural thickening on imaging studies  --CXR 8/4/2021:  Mild pulmonary venous congestion with mild bilateral perihilar interstitial opacities significantly improved since 10/06/2016 and most likely representing mild CHF. There is some scarring in the left lower lung and possible pleural calcification which is stable.     3.  GI:  Risk of PONV score = 1.  If > 2, anti-emetic intervention recommended.    4.  Endo:  --NIDDM, hold glipizide, metformin and linagliptin DOS  --A1c 7.6%,  8/4/21    5.  Renal:   --CKD.  Creatinine 3.17 with GFR 24, 11/13/21    6.  Heme:  --anemia of chronic disease, hgb 12.1, 11/13/21.  On oral iron  --Surgeon requests INR to be ~1.5.  Followed by anticoagulation clinic through [facility]. Last dose of warfarin was 11/22/21.  Last INR 1.9 on 11/22/21. Will check INR today and DOS.  --Surgeon asked " patient to hold ASA if possible; I contacted PCP and recommendation was to continue ASA 81 due to h/o CAD/CABG.  Staff message sent to surgeon.      VTE risk: 1.8%        Patient discussed with Dr. Aj    **For further details of assessment, testing, and physical exam please see H and P completed on same date.          Shanda Albarado PA-C, [facility] [place]    Reviewed and Signed by PAC Mid-Level Provider/Resident  Mid-Level Provider/Resident: Shanda Albarado  Date: 11/23/21      Attending Anesthesiologist Anesthesia Assessment: Stable heart dx, no sxs currently ,s/p CABG  2016  no further testing required prior to procedure  Reviewed and Signed by PAC Anesthesiologist  Anesthesiologist: Camila  Date: 11/22/21                     Shanda Albarado PA-C

## 2021-11-23 NOTE — H&P
Pre-Operative H & P     CC:  Preoperative exam to assess for increased cardiopulmonary risk while undergoing surgery and anesthesia.    Date of Encounter: 11/23/2021  Primary Care Physician:  Basilio Pacheco     Reason for visit:   Encounter Diagnoses   Name Primary?     Preop examination Yes     Vitreous hemorrhage, right eye (H)      Proliferative diabetic retinopathy of both eyes associated with type 2 diabetes mellitus (H)      Chronic diastolic heart failure (H)        HPI  Nicholas Sultana is a 66 year old male who presents for pre-operative H & P in preparation for Right eye: vitrectomy, membrane stripping, laser, possible bubble with Dr. Concepcion on 11/26/21 at [facility].     This is a 66-year-old male with a complex medical history significant for coronary artery disease status post CABG x5 with Maze procedure and left appendage clip 9/20/2016, atrial fibrillation on warfarin, chronic diastolic heart failure, COPD, YAS, history of blood transfusion, mild anemia, diabetes, chronic renal insufficiency, blindness of the left eye.    Nicholas presented to an outside emergency department on 11/13/2021 was transported to the emergency of [place] for further evaluation.  He was seen by ophthalmology and diagnosed with a vitreous hemorrhage.  He states he has very little vision in his right eye.  He can see light changes and can see shapes up close.  The above procedure is now planned.    History is obtained from the patient and chart review      Hx of abnormal bleeding or anti-platelet use: warfarin, ASA 81    Menstrual history: No LMP for male patient.:     Prior to Admission Medications  Current Outpatient Medications   Medication Sig Dispense Refill     acetaminophen (TYLENOL) 500 MG tablet Take 500-1,000 mg by mouth every 6 hours as needed for mild pain       albuterol (PROAIR HFA/PROVENTIL HFA/VENTOLIN HFA) 108 (90 Base) MCG/ACT inhaler Inhale 1-2 puffs into  the lungs daily as needed       ASPIRIN 81 MG OR TABS Take 81 mg by mouth every morning  100 3     bumetanide (BUMEX) 2 MG tablet Take 4 mg by mouth every morning       cephALEXin (KEFLEX) 500 MG capsule Take 500 mg by mouth       ferrous sulfate (FEROSUL) 325 (65 Fe) MG tablet Take 325 mg by mouth 2 times daily       GLUCOPHAGE 500 MG OR TABS ONE TABLET TWICE DAILY, WITH FOOD (Patient taking differently: Take 500 mg by mouth 2 times daily (with meals) ) 3 months 1 YEAR     GLUCOTROL XL# 10 MG OR TB24 ONE DAILY- patient needs follow up appointment (Patient taking differently: Take 10 mg by mouth daily ) 1 month 1     isosorbide mononitrate (IMDUR) 30 MG 24 hr tablet Take 30 mg by mouth every morning       isosorbide mononitrate (IMDUR) 60 MG 24 hr tablet Take 1 tablet by mouth every morning       linagliptin (TRADJENTA) 5 MG TABS tablet Take 5 mg by mouth every morning       LISINOPRIL 40 MG OR TABS ONE DAILY-he is transferring this rx to you. please contact patient to review transfer 90 2     potassium chloride ER (MICRO-K) 10 MEQ CR capsule Take 10 mEq by mouth every morning       SIMVASTATIN 40 MG OR TABS 1 TABLET AT BEDTIME (Patient taking differently: Take 40 mg by mouth every morning ) 3 months 1 year     triamcinolone (KENALOG) 0.1 % external ointment        warfarin ANTICOAGULANT (COUMADIN) 3 MG tablet Take 3 mg by mouth       metoprolol tartrate (LOPRESSOR) 50 MG tablet Take 50 mg by mouth 2 times daily       ONE TOUCH ULTRA TEST VI STRP test BID-duplicate request. please fill from rx faxed to you 07 3 months 1 year     VIAGRA 100 MG OR TABS ONE TABLET TAKEN AT LEAST 30 MINUTES BEFORE INTERCOURSE (Patient not taking: Reported on 2021) 6 6       Family History  Family History   Problem Relation Age of Onset     Hypertension Mother      Diabetes Mother      Kidney Disease Father            Anesthesia Pre-Procedure Evaluation    Patient: Nicholas Sultana   MRN: [id number] : 1955         Preoperative Diagnosis: * No surgery found *    Procedure :   PAC EVALUATION       Past Medical History:   Diagnosis Date     Chronic ulcer of other specified site     right foot     Tobacco use disorder      Type II or unspecified type diabetes mellitus without mention of complication, not stated as uncontrolled      Unspecified chronic bronchitis (H)     recurrant     Unspecified essential hypertension       Past Surgical History:   Procedure Laterality Date     C EXC PRES ULCER UNLISTED      I&D of ulcer       Allergies   Allergen Reactions     Other Drug Allergy (See Comments) Muscle Pain (Myalgia)     Statins per pt      Social History     Tobacco Use     Smoking status: Former Smoker     Packs/day: 1.50     Years: 15.00     Pack years: 22.50     Types: Cigarettes     Quit date: 2013     Years since quittin.8     Smokeless tobacco: Never Used   Substance Use Topics     Alcohol use: Not Currently      Wt Readings from Last 1 Encounters:   08 105.8 kg (233 lb 4 oz)        Anesthesia Evaluation   Pt has had prior anesthetic.     No history of anesthetic complications     The complete review of systems is negative other than noted in the HPI or here.       ROS/MED HX  ENT/Pulmonary: Comment: Asbestos plaques    (+) sleep apnea, doesn't use CPAP, COPD,     Neurologic:  - neg neurologic ROS  (-) no seizures and no CVA   Cardiovascular:     (+) hypertension--CAD -CABG-date: 2016. -Taking blood thinners CHF etiology: chronic diastolic HF Last EF: 55-60% date: 2016 dysrhythmias, a-fib, Previous cardiac testing   Echo: Date: 9/15/2016 Results:  Final Conclusion    Limited echo for EF.    Definity was used to enhance endocardial definition secondary to suboptimal image quality.    Normal left ventricular ejection fraction estimated at 55-60%.    Estimated RV systolic pressure of 58.4 mmHg + RA pressure.    No pericardial effusion.    An effusion (pericardial) was seen on the previous echo.  No  significant effusion is seen on   today's study.    Estimated EF: 55-60%    FINDINGS    Left Ventricle Normal left ventricular size and systolic function. Normal left ventricular   ejection fraction estimated at 55-60%.    Right Ventricle Right ventricle not well visualized.    Left Atrium Not applicable.    Right Atrium Not applicable.    IA Septum: Not applicable.    IVC Not applicable.    Aortic Valve Not applicable.    Mitral Valve Not applicable.    Tricuspid Valve Trace tricuspid regurgitation. Estimated RV systolic pressure of 58.4 mmHg +   RA pressure.    Pulmonic Valve Not applicable.    Aorta Not applicable.    Pericardium No pericardial effusion.     Stress Test: Date: Results:    ECG Reviewed: Date: 8/4/21 Results:  Normal sinus rhythm   Right superior axis deviation   Right ventricular hypertrophy   Prolonged QT (390/482)  Abnormal ECG   Cath: Date: Results:      METS/Exercise Tolerance:  Comment: <4   Hematologic:     (+) history of blood transfusion, no previous transfusion reaction,  (-) history of blood clots   Musculoskeletal: Comment: Partial right foot amputation    Gout right knee  (+) arthritis,     GI/Hepatic:    (-) GERD   Renal/Genitourinary: Comment: Creatinine 3.17, GFR 24 (11/13/21)    (+) renal disease, type: CRI, Pt does not require dialysis,     Endo: Comment: Hypoglycemic episode 8/2021    (+) type II DM, Last HgA1c: 7.6, date: 8/4/21, Not using insulin,  (-) thyroid disease   Psychiatric/Substance Use:  - neg psychiatric ROS     Infectious Disease:  - neg infectious disease ROS     Malignancy:  - neg malignancy ROS     Other:            OUTSIDE LABS:  CBC: No results found for: WBC, HGB, HCT, PLT  BMP:   Lab Results   Component Value Date     (L) 01/02/2018     12/12/2007    POTASSIUM 4.4 01/02/2018    POTASSIUM 4.6 12/12/2007    CHLORIDE 100 01/02/2018    CHLORIDE 101 12/12/2007    CO2 21 (L) 01/02/2018    CO2 28 12/12/2007    BUN 61 (H) 01/02/2018    BUN 12 12/12/2007  "   CR 2.08 (H) 01/02/2018    CR 0.93 12/12/2007     (H) 01/02/2018     (H) 12/12/2007     COAGS:   Lab Results   Component Value Date    INR 2.76 (H) 01/08/2018     POC: No results found for: BGM, HCG, HCGS  HEPATIC:   Lab Results   Component Value Date    ALBUMIN 3.9 12/12/2007    PROTTOTAL 7.1 12/12/2007    ALT 21 12/12/2007    AST 22 12/12/2007    ALKPHOS 110 12/12/2007    BILITOTAL 0.4 12/12/2007     OTHER:   Lab Results   Component Value Date    A1C 8.2 (H) 12/12/2007    ENA 9.5 01/02/2018           /64 (BP Location: Right arm, Patient Position: Chair, Cuff Size: Adult Regular)   Pulse 70   Temp 97.9  F (36.6  C) (Oral)   Resp 20   Ht 1.778 m (5' 10\")   Wt 81.6 kg (180 lb)   SpO2 92%   BMI 25.83 kg/m           Physical Exam  Constitutional: Awake, alert, cooperative, no apparent distress, and appears older than stated age.  Sitting in wheelchair.  Eyes: extra ocular muscles intact, sclera clear, conjunctiva normal.  HENT: Normocephalic, oral pharynx with moist mucus membranes, dentures. No goiter appreciated.   Respiratory: Clear to auscultation bilaterally, no crackles or wheezing.  Cardiovascular: Regular rate and rhythm, normal S1 and S2, and no murmur noted.  Carotids +2, no bruits. No edema. Palpable pulses to radial  PT arteries.   GI: Normal bowel sounds  Lymph/Hematologic: No cervical lymphadenopathy and no supraclavicular lymphadenopathy.  Genitourinary:  deferred  Skin: Warm and dry.  No rashes at anticipated surgical site.   Musculoskeletal: Full ROM of neck. There is no redness, warmth, or swelling of the joints. Gross motor strength is normal.    Neurologic: Awake, alert, oriented to name, place and time. Cranial nerves II-XII are grossly intact.    Neuropsychiatric: Calm, cooperative. Normal affect.       PRIOR LABS/DIAGNOSTIC STUDIES:   All labs and imaging personally reviewed   Component      Latest Ref Rng & Units 11/23/2021   WBC      4.0 - 11.0 10e3/uL 10.6   RBC " Count      4.40 - 5.90 10e6/uL 4.16 (L)   Hemoglobin      13.3 - 17.7 g/dL 12.5 (L)   Hematocrit      40.0 - 53.0 % 40.3   MCV      78 - 100 fL 97   MCH      26.5 - 33.0 pg 30.0   MCHC      31.5 - 36.5 g/dL 31.0 (L)   RDW      10.0 - 15.0 % 14.4   Platelet Count      150 - 450 10e3/uL 208     Component      Latest Ref Rng & Units 11/23/2021   INR      0.85 - 1.15 1.77 (H)     Component      Latest Ref Rng & Units 11/23/2021   Sodium      133 - 144 mmol/L 140   Potassium      3.4 - 5.3 mmol/L 4.7   Chloride      94 - 109 mmol/L 103   Carbon Dioxide      20 - 32 mmol/L 27   Anion Gap      3 - 14 mmol/L 10   Urea Nitrogen      7 - 30 mg/dL 108 (H)   Creatinine      0.66 - 1.25 mg/dL 2.53 (H)   Calcium      8.5 - 10.1 mg/dL 9.7   Glucose      70 - 99 mg/dL 231 (H)   GFR Estimate      >60 mL/min/1.73m2 25 (L)   Hemoglobin A1C      0.0 - 5.6 % 7.2 (H)       EKG/ echo/stress test - if available please see in ROS above           Outside records reviewed from: care everywhere        ASSESSMENT and PLAN  Nicholas Sultana is a 66 year old male scheduled for Right eye: vitrectomy, membrane stripping, laser, possible bubble on 11/26/21 by Dr. Concepcion in treatment of Vitreous hemorrhage of right eye, and proliferative diabetic retinopahy.  PAC referral for risk assessment and optimization for anesthesia with comorbid conditions of HTN, paroxysmal atrial fibrillation on warfarin, chronic diastolic heart failure, CAD s/p CABG x5 (9/2016), COPD, asbestos pleural plaques, YAS, NIDDM, obesity, CKD:    Pre-operative considerations:  1.  Cardiac:  Functional status- METS <4.  Low risk surgery with 11% (RCRI #) risk of major adverse cardiac event (CHF, h/o ischemic heart disease, creatinine >2).   --CAD s/p CABGx5 with Maze procedure and left appendage clip 9/2/2016.  Last visit with cardiology 2/18/21 with follow up one year planned with echo before.  Last echo 9/15/2016 with EF 55-60%, RSVP 58.4 + RAP  --paroxysmal atrial  "fibrillation on warfarin.   --today patient denies chest pain, chest pressure, palpitations, SOB, peripheral edema, PND, orthopnea.  Pt states he has no difficulty lying flat.  --HTN, continue lisinopril, metoprolol, Imdur  --Chronic diastolic heart failure, echo as above. Last EF 2016, 55-60%.  Continue diuretics as directed.  Euvolemic today.  --Discussed option of pursuing echo with anesthesia.  Was not able to find echo appt prior to above surgery.  Would not delay surgery as pt does not have any new symptoms.  He is stable since his visit with cardiology 2/2021.    2.  Pulm:  Airway feasible.    --YAS does not use CPAP.  He does not think he needs it  --former smoker  --COPD, does not use inhalers because he \"doesn't need them\".  Saw pulmonology last 12/2016.  SpO2 today 92% on RA.  Lungs CTA.  --denies cough but states he occasionally produces phlegm.  Could consider preop nebulizer.    --per pulmonary note 12/2016:  Date ratio FEV1 FVC   April 06, 2016  52   0.95/28%  1.82/40%   December 07, 2016  58  1.22/36%  2.08/46%   --Asbestos-induced pleural plaque and pleural thickening on imaging studies  --CXR 8/4/2021:  Mild pulmonary venous congestion with mild bilateral perihilar interstitial opacities significantly improved since 10/06/2016 and most likely representing mild CHF. There is some scarring in the left lower lung and possible pleural calcification which is stable.     3.  GI:  Risk of PONV score = 1.  If > 2, anti-emetic intervention recommended.    4.  Endo:  --NIDDM, hold glipizide, metformin and linagliptin DOS  --A1c 7.6%,  8/4/21    5.  Renal:   --CKD.  Creatinine 3.17 with GFR 24, 11/13/21    6.  Heme:  --anemia of chronic disease, hgb 12.1, 11/13/21.  On oral iron  --Surgeon requests INR to be ~1.5.  Followed by anticoagulation clinic through [facility]. Last dose of warfarin was 11/22/21.  Last INR 1.9 on 11/22/21. Will check INR today and DOS.  --Surgeon asked patient to hold ASA if possible; I " contacted PCP and recommendation was to continue ASA 81 due to h/o CAD/CABG.  Staff message sent to surgeon.    VTE risk: 1.8%        Patient discussed with Dr. Aj      On the day of service:     Prep time: 15 minutes  Visit time: 16 minutes  Documentation time: 15 minutes  ------------------------------------------  Total time: 46 minutes      Shanda Albarado PA-C  Preoperative Assessment Center  [facility]  Phone: [phone]  Fax: [phone]

## 2021-11-26 ENCOUNTER — ANESTHESIA (OUTPATIENT)
Dept: SURGERY | Facility: CLINIC | Age: 66
End: 2021-11-26
Payer: COMMERCIAL

## 2021-11-26 ENCOUNTER — ANESTHESIA EVENT (OUTPATIENT)
Dept: SURGERY | Facility: CLINIC | Age: 66
End: 2021-11-26
Payer: COMMERCIAL

## 2021-11-26 ENCOUNTER — HOSPITAL ENCOUNTER (OUTPATIENT)
Facility: CLINIC | Age: 66
Discharge: HOME | End: 2021-11-26
Attending: OPHTHALMOLOGY | Admitting: OPHTHALMOLOGY
Payer: COMMERCIAL

## 2021-11-26 VITALS
OXYGEN SATURATION: 96 % | DIASTOLIC BLOOD PRESSURE: 70 MMHG | SYSTOLIC BLOOD PRESSURE: 157 MMHG | TEMPERATURE: 98.6 F | RESPIRATION RATE: 16 BRPM | HEIGHT: 68 IN | WEIGHT: 186.29 LBS | HEART RATE: 81 BPM | BODY MASS INDEX: 28.23 KG/M2

## 2021-11-26 DIAGNOSIS — H43.11: Primary | ICD-10-CM

## 2021-11-26 DIAGNOSIS — E11.39: Primary | ICD-10-CM

## 2021-11-26 LAB
GLUCOSE BLDC GLUCOMTR-MCNC: 142 MG/DL (ref 70–99)
INR PPP: 1.27 (ref 0.86–1.14)

## 2021-11-26 PROCEDURE — 250N000009 HC RX 250: Performed by: OPHTHALMOLOGY

## 2021-11-26 PROCEDURE — 250N000011 HC RX IP 250 OP 636: Performed by: OPHTHALMOLOGY

## 2021-11-26 PROCEDURE — 250N000009 HC RX 250

## 2021-11-26 PROCEDURE — 272N000001 HC OR GENERAL SUPPLY STERILE: Performed by: OPHTHALMOLOGY

## 2021-11-26 PROCEDURE — 36415 COLL VENOUS BLD VENIPUNCTURE: CPT | Performed by: PHYSICIAN ASSISTANT

## 2021-11-26 PROCEDURE — 360N000077 HC SURGERY LEVEL 4, PER MIN: Performed by: OPHTHALMOLOGY

## 2021-11-26 PROCEDURE — 258N000001 HC RX 258: Performed by: OPHTHALMOLOGY

## 2021-11-26 PROCEDURE — 82962 GLUCOSE BLOOD TEST: CPT

## 2021-11-26 PROCEDURE — 85610 PROTHROMBIN TIME: CPT | Performed by: PHYSICIAN ASSISTANT

## 2021-11-26 PROCEDURE — 258N000003 HC RX IP 258 OP 636

## 2021-11-26 PROCEDURE — 67041 VIT FOR MACULAR PUCKER: CPT | Mod: RT | Performed by: STUDENT IN AN ORGANIZED HEALTH CARE EDUCATION/TRAINING PROGRAM

## 2021-11-26 PROCEDURE — 250N000011 HC RX IP 250 OP 636: Performed by: STUDENT IN AN ORGANIZED HEALTH CARE EDUCATION/TRAINING PROGRAM

## 2021-11-26 PROCEDURE — 710N000012 HC RECOVERY PHASE 2, PER MINUTE: Performed by: OPHTHALMOLOGY

## 2021-11-26 PROCEDURE — 999N000141 HC STATISTIC PRE-PROCEDURE NURSING ASSESSMENT: Performed by: OPHTHALMOLOGY

## 2021-11-26 PROCEDURE — 250N000009 HC RX 250: Performed by: STUDENT IN AN ORGANIZED HEALTH CARE EDUCATION/TRAINING PROGRAM

## 2021-11-26 PROCEDURE — 370N000017 HC ANESTHESIA TECHNICAL FEE, PER MIN: Performed by: OPHTHALMOLOGY

## 2021-11-26 PROCEDURE — 250N000011 HC RX IP 250 OP 636

## 2021-11-26 RX ORDER — LIDOCAINE 40 MG/G
CREAM TOPICAL
Status: DISCONTINUED | OUTPATIENT
Start: 2021-11-26 | End: 2021-11-26 | Stop reason: HOSPADM

## 2021-11-26 RX ORDER — PREDNISOLONE ACETATE 10 MG/ML
1-2 SUSPENSION/ DROPS OPHTHALMIC 4 TIMES DAILY
Qty: 10 ML | Refills: 1 | Status: SHIPPED | OUTPATIENT
Start: 2021-11-26 | End: 2022-01-21

## 2021-11-26 RX ORDER — SODIUM CHLORIDE, SODIUM LACTATE, POTASSIUM CHLORIDE, CALCIUM CHLORIDE 600; 310; 30; 20 MG/100ML; MG/100ML; MG/100ML; MG/100ML
INJECTION, SOLUTION INTRAVENOUS CONTINUOUS
Status: DISCONTINUED | OUTPATIENT
Start: 2021-11-26 | End: 2021-11-26 | Stop reason: HOSPADM

## 2021-11-26 RX ORDER — POLYMYXIN B SULFATE AND TRIMETHOPRIM 1; 10000 MG/ML; [USP'U]/ML
1-2 SOLUTION OPHTHALMIC EVERY 4 HOURS
Qty: 10 ML | Refills: 0 | Status: SHIPPED | OUTPATIENT
Start: 2021-11-26 | End: 2022-01-21

## 2021-11-26 RX ORDER — BALANCED SALT SOLUTION 6.4; .75; .48; .3; 3.9; 1.7 MG/ML; MG/ML; MG/ML; MG/ML; MG/ML; MG/ML
SOLUTION OPHTHALMIC PRN
Status: DISCONTINUED | OUTPATIENT
Start: 2021-11-26 | End: 2021-11-26 | Stop reason: HOSPADM

## 2021-11-26 RX ORDER — PROPARACAINE HYDROCHLORIDE 5 MG/ML
1 SOLUTION/ DROPS OPHTHALMIC ONCE
Status: COMPLETED | OUTPATIENT
Start: 2021-11-26 | End: 2021-11-26

## 2021-11-26 RX ORDER — ONDANSETRON 2 MG/ML
INJECTION INTRAMUSCULAR; INTRAVENOUS PRN
Status: DISCONTINUED | OUTPATIENT
Start: 2021-11-26 | End: 2021-11-26

## 2021-11-26 RX ORDER — CYCLOPENTOLAT/TROPIC/PHENYLEPH 1%-1%-2.5%
1 DROPS (EA) OPHTHALMIC (EYE)
Status: COMPLETED | OUTPATIENT
Start: 2021-11-26 | End: 2021-11-26

## 2021-11-26 RX ORDER — PROPOFOL 10 MG/ML
INJECTION, EMULSION INTRAVENOUS CONTINUOUS PRN
Status: DISCONTINUED | OUTPATIENT
Start: 2021-11-26 | End: 2021-11-26

## 2021-11-26 RX ORDER — FENTANYL CITRATE 50 UG/ML
INJECTION, SOLUTION INTRAMUSCULAR; INTRAVENOUS PRN
Status: DISCONTINUED | OUTPATIENT
Start: 2021-11-26 | End: 2021-11-26

## 2021-11-26 RX ORDER — ONDANSETRON 4 MG/1
4 TABLET, ORALLY DISINTEGRATING ORAL EVERY 30 MIN PRN
Status: CANCELLED | OUTPATIENT
Start: 2021-11-26

## 2021-11-26 RX ORDER — PROPOFOL 10 MG/ML
INJECTION, EMULSION INTRAVENOUS PRN
Status: DISCONTINUED | OUTPATIENT
Start: 2021-11-26 | End: 2021-11-26

## 2021-11-26 RX ORDER — DEXMEDETOMIDINE HYDROCHLORIDE 4 UG/ML
INJECTION, SOLUTION INTRAVENOUS PRN
Status: DISCONTINUED | OUTPATIENT
Start: 2021-11-26 | End: 2021-11-26

## 2021-11-26 RX ORDER — SODIUM CHLORIDE, SODIUM LACTATE, POTASSIUM CHLORIDE, CALCIUM CHLORIDE 600; 310; 30; 20 MG/100ML; MG/100ML; MG/100ML; MG/100ML
INJECTION, SOLUTION INTRAVENOUS CONTINUOUS PRN
Status: DISCONTINUED | OUTPATIENT
Start: 2021-11-26 | End: 2021-11-26

## 2021-11-26 RX ORDER — FENTANYL CITRATE 50 UG/ML
25 INJECTION, SOLUTION INTRAMUSCULAR; INTRAVENOUS EVERY 5 MIN PRN
Status: CANCELLED | OUTPATIENT
Start: 2021-11-26

## 2021-11-26 RX ORDER — SODIUM CHLORIDE, SODIUM LACTATE, POTASSIUM CHLORIDE, CALCIUM CHLORIDE 600; 310; 30; 20 MG/100ML; MG/100ML; MG/100ML; MG/100ML
INJECTION, SOLUTION INTRAVENOUS CONTINUOUS
Status: CANCELLED | OUTPATIENT
Start: 2021-11-26

## 2021-11-26 RX ORDER — ONDANSETRON 2 MG/ML
4 INJECTION INTRAMUSCULAR; INTRAVENOUS EVERY 30 MIN PRN
Status: CANCELLED | OUTPATIENT
Start: 2021-11-26

## 2021-11-26 RX ORDER — ATROPINE SULFATE 10 MG/ML
SOLUTION/ DROPS OPHTHALMIC PRN
Status: DISCONTINUED | OUTPATIENT
Start: 2021-11-26 | End: 2021-11-26 | Stop reason: HOSPADM

## 2021-11-26 RX ORDER — LIDOCAINE HYDROCHLORIDE 20 MG/ML
INJECTION, SOLUTION INFILTRATION; PERINEURAL PRN
Status: DISCONTINUED | OUTPATIENT
Start: 2021-11-26 | End: 2021-11-26

## 2021-11-26 RX ADMIN — FENTANYL CITRATE 35 MCG: 50 INJECTION, SOLUTION INTRAMUSCULAR; INTRAVENOUS at 07:50

## 2021-11-26 RX ADMIN — SODIUM CHLORIDE, POTASSIUM CHLORIDE, SODIUM LACTATE AND CALCIUM CHLORIDE: 600; 310; 30; 20 INJECTION, SOLUTION INTRAVENOUS at 07:36

## 2021-11-26 RX ADMIN — LIDOCAINE HYDROCHLORIDE 100 MG: 20 INJECTION, SOLUTION INFILTRATION; PERINEURAL at 07:51

## 2021-11-26 RX ADMIN — PROPOFOL 40 MG: 10 INJECTION, EMULSION INTRAVENOUS at 07:53

## 2021-11-26 RX ADMIN — ONDANSETRON 4 MG: 2 INJECTION INTRAMUSCULAR; INTRAVENOUS at 09:46

## 2021-11-26 RX ADMIN — DEXMEDETOMIDINE 4 MCG: 100 INJECTION, SOLUTION, CONCENTRATE INTRAVENOUS at 09:46

## 2021-11-26 RX ADMIN — DEXMEDETOMIDINE 8 MCG: 100 INJECTION, SOLUTION, CONCENTRATE INTRAVENOUS at 07:51

## 2021-11-26 RX ADMIN — Medication 1 DROP: at 07:07

## 2021-11-26 RX ADMIN — Medication 1 DROP: at 06:58

## 2021-11-26 RX ADMIN — PROPARACAINE HYDROCHLORIDE 1 DROP: 5 SOLUTION/ DROPS OPHTHALMIC at 06:55

## 2021-11-26 RX ADMIN — DEXMEDETOMIDINE 8 MCG: 100 INJECTION, SOLUTION, CONCENTRATE INTRAVENOUS at 08:14

## 2021-11-26 RX ADMIN — Medication 1 DROP: at 07:02

## 2021-11-26 ASSESSMENT — MIFFLIN-ST. JEOR: SCORE: 1599.5

## 2021-11-26 NOTE — ANESTHESIA POSTPROCEDURE EVALUATION
Patient: Nicholas Sultana    Procedure: Procedure(s):  Right eye:  Pars Plana vitrectomy, membrane stripping, Endolaser       Diagnosis:Vitreous hemorrhage of right eye (H) [H43.11]  Proliferative diabetic retinopathy of both eyes associated with type 2 diabetes mellitus, macular edema presence unspecified (H) [E11.3593]  Diagnosis Additional Information: No value filed.    Anesthesia Type:  MAC    Note:     Postop Pain Control: Uneventful            Sign Out: Well controlled pain   PONV: No   Neuro/Psych: Uneventful            Sign Out: Acceptable/Baseline neuro status   Airway/Respiratory: Uneventful            Sign Out: Acceptable/Baseline resp. status   CV/Hemodynamics: Uneventful            Sign Out: Acceptable CV status; No obvious hypovolemia; No obvious fluid overload   Other NRE: NONE   DID A NON-ROUTINE EVENT OCCUR? No           Last vitals:  Vitals Value Taken Time   /65 11/26/21 1005   Temp 36.9  C (98.5  F) 11/26/21 1004   Pulse 85 11/26/21 1005   Resp 16 11/26/21 1004   SpO2 91 % 11/26/21 1026   Vitals shown include unvalidated device data.    Electronically Signed By: Sagar Oliva DO  November 26, 2021  10:27 AM

## 2021-11-26 NOTE — ANESTHESIA PREPROCEDURE EVALUATION
Anesthesia Pre-Procedure Evaluation    Patient: Nicholas Sultana   MRN: [id number] : 1955        Preoperative Diagnosis: Vitreous hemorrhage of right eye (H) [H43.11]  Proliferative diabetic retinopathy of both eyes associated with type 2 diabetes mellitus, macular edema presence unspecified (H) [E11.3593]    Procedure : Procedure(s):  Right eye: vitrectomy, membrane stripping, laser , possible bubble          Past Medical History:   Diagnosis Date     CAD (coronary artery disease)      Chronic ulcer of other specified site     right foot     Tobacco use disorder      Type II or unspecified type diabetes mellitus without mention of complication, not stated as uncontrolled      Unspecified chronic bronchitis (H)     recurrant     Unspecified essential hypertension       Past Surgical History:   Procedure Laterality Date     BYPASS GRAFT ARTERY CORONARY  2016    x5     C EXC PRES ULCER UNLISTED      I&D of ulcer      righ foot partial amputation Right 2017      Allergies   Allergen Reactions     Other Drug Allergy (See Comments) Muscle Pain (Myalgia)     Statins per pt      Social History     Tobacco Use     Smoking status: Former Smoker     Packs/day: 1.50     Years: 15.00     Pack years: 22.50     Types: Cigarettes     Quit date: 2013     Years since quittin.8     Smokeless tobacco: Never Used   Substance Use Topics     Alcohol use: Not Currently      Wt Readings from Last 1 Encounters:   21 84.5 kg (186 lb 4.6 oz)           Physical Exam    Airway        Mallampati: II   TM distance: > 3 FB   Neck ROM: limited   Mouth opening: > 3 cm    Respiratory Devices and Support         Dental       (+) upper dentures and lower dentures      Cardiovascular   cardiovascular exam normal          Pulmonary           (+) decreased breath sounds           OUTSIDE LABS:  CBC:   Lab Results   Component Value Date    WBC 10.6 2021    HGB 12.5 (L) 2021    HCT 40.3 2021      11/23/2021     BMP:   Lab Results   Component Value Date     11/23/2021     (L) 01/02/2018    POTASSIUM 4.7 11/23/2021    POTASSIUM 4.4 01/02/2018    CHLORIDE 103 11/23/2021    CHLORIDE 100 01/02/2018    CO2 27 11/23/2021    CO2 21 (L) 01/02/2018     (H) 11/23/2021    BUN 61 (H) 01/02/2018    CR 2.53 (H) 11/23/2021    CR 2.08 (H) 01/02/2018     (H) 11/26/2021     (H) 11/23/2021     COAGS:   Lab Results   Component Value Date    INR 1.77 (H) 11/23/2021     POC: No results found for: BGM, HCG, HCGS  HEPATIC:   Lab Results   Component Value Date    ALBUMIN 3.9 12/12/2007    PROTTOTAL 7.1 12/12/2007    ALT 21 12/12/2007    AST 22 12/12/2007    ALKPHOS 110 12/12/2007    BILITOTAL 0.4 12/12/2007     OTHER:   Lab Results   Component Value Date    A1C 7.2 (H) 11/23/2021    ENA 9.7 11/23/2021       Anesthesia Plan    ASA Status:  3      Anesthesia Type: MAC.              Consents         - Extended Intubation/Ventilatory Support Discussed: No.      - Patient is DNR/DNI Status: No    Use of blood products discussed: No .     Postoperative Care            Comments:                Sagar Oliva DO

## 2021-11-26 NOTE — BRIEF OP NOTE
[facility]    Brief Operative Note    Pre-operative diagnosis: Vitreous hemorrhage of right eye (H) [H43.11]  Proliferative diabetic retinopathy of both eyes associated with type 2 diabetes mellitus, macular edema presence unspecified (H) [E11.3593]  Post-operative diagnosis Same as pre-operative diagnosis    Procedure: Procedure(s):  Right eye:  Pars Plana vitrectomy, membrane stripping, Endolaser  Surgeon: Surgeon(s) and Role:     * Nidhi Concepcion MD - Primary  Anesthesia: Combined MAC with Retrobulbar   Estimated Blood Loss: None    Drains: None  Specimens: * No specimens in log *  Findings:   None.  Complications: None.  Implants: * No implants in log *

## 2021-11-26 NOTE — OP NOTE
PRE-OP Dx:    1)Non-clearing vitreous hemorrhage, right eye    Post-OP Dx: same    Attending:   MARA Concepcion MD  Resident:   None  Fellow: Nicholas Rodriguez MD    Anesthesia: MAC + RB    Procedure:    1) Pars plana vitrectomy (PPV) 25g OD   2) Membrane Peel   3) Endolaser   4) Intravitreal avastin injection    EBL: scant  Specimens: none  Complications: none    Findings:    1) Vitreous hemorrhage, right eye   2) Tractional/fibrotic  complex over nasal posterior retina    3) Optic nerve pallor and cupping      Procedure Description:    Nicholas Sultana is a 66 year old patient with a history of non-clearing vitreous hemorrhage in the right eye.  After informed consent was obtained, he was brought into the operating room where retrobulbar anesthesia was administered.  The eye was then prepped and draped in the usual fashion for ophthalmic surgery.    Attention was then turned to the vitrectomy.  Marks were made on the sclera inferotemporally, superotemporally, and superonasally 3.5 mm posterior to the limbus.  The 25g transscleral cannulas were inserted through the sclera using the trocars.  The infusion cannula was connected to the inferonasal cannula and directly visualized to verify it was in the correct location.  A core vitrectomy was performed and the vitreous was stained with kenalog. The hyaloid was seen to be elevated temporally.  There was fibrosis from regressed NVE inferonasal to the ONH.  This was segmented and isolated using the vitrector and barbed MVR blade. There were adherent clots overlying some portions which prevented complete visualization of the fibrosis and complete segmentation.  The periphery was trimmed with depression and inspected.The endolaser was used to perform PRP.     The cannulas were removed. An intravitreal injection of bevacizumab (1.25mg in 0.05 mL) was administered. Afterwards, a paracentesis of the anterior chamber was performed infero-temporally. The intraocular pressure was  checked and confirmed to be appropriate.     The sclerotomies were sutured as needed with 6-0 plain suture and were tight.  The pressure was checked and verified to be appropriate.  A drop of atropine was placed in the eye with maxitrolointment.  A pad and arita shield were taped over the eye.    The patient left the OR with no complications.    Nicholas Rodriguez MD  Retina Fellow  Department of Ophthalmology  [facility]  Pager: [phone]      The surgery was assisted by AUGUSTO Rodriguez MD, because no qualified resident was available to assist on the day of surgery.  Due to the delicate and complex nature of this surgery, a skilled assistant was required with vitrectomy, membrane stripping, and endolaser.  I was present for the entire surgery.      Nidhi Concepcion MD, PhD

## 2021-11-26 NOTE — ANESTHESIA CARE TRANSFER NOTE
Patient: Nicholas Sultana    Procedure: Procedure(s):  Right eye:  Pars Plana vitrectomy, membrane stripping, Endolaser       Diagnosis: Vitreous hemorrhage of right eye (H) [H43.11]  Proliferative diabetic retinopathy of both eyes associated with type 2 diabetes mellitus, macular edema presence unspecified (H) [E11.3593]  Diagnosis Additional Information: No value filed.    Anesthesia Type:   MAC     Note:      Level of Consciousness: awake          Vital Signs Stable: post-procedure vital signs reviewed and stable  Report to RN Given: handoff report given  Patient transferred to: Phase II    Handoff Report: Identifed the Patient, Identified the Reponsible Provider, Reviewed the pertinent medical history, Discussed the surgical course, Reviewed Intra-OP anesthesia mangement and issues during anesthesia, Set expectations for post-procedure period and Allowed opportunity for questions and acknowledgement of understanding      Vitals:  Vitals Value Taken Time   /65 11/26/21 1005   Temp 36.9  C (98.5  F) 11/26/21 1004   Pulse 85 11/26/21 1005   Resp 16 11/26/21 1004   SpO2 95 % 11/26/21 1024   Vitals shown include unvalidated device data.    Electronically Signed By: Sagar Oliva DO  November 26, 2021  10:25 AM

## 2021-11-26 NOTE — DISCHARGE INSTRUCTIONS
POST-OPERATIVE INSTRUCTIONS FOLLOWING SURGERY    Nidhi Concepcion MD, PhD  Department of Ophthalmology  [facility]  [phone]    FOLLOW UP:  You have a follow up appointment tomorrow at 9 AM at the Eye Clinic in the [facility], 4th floor:    [address] [place] [address]      EYE DROPS  Drops will be given to you after the surgery.  They DO NOT need to be used until after you are seen in clinic.  DO NOT disturb your bandage the first night.      When using more than one drop, separate them by 3 minutes between drops.  Common times to place drops are breakfast, lunch, dinner, and bedtime.  Do not stop your drops without discussing with our office.  If you run out before your appointment, call and we will send in a refill.    Pred Forte (prednisolone acetate) --- 4 times per day  Polytrim (white top) --- 4 times per day    POSITIONING:    Please keep your head elevated at all times. Use 4-5 pillows or sleep in a recliner chair when you sleep at night.    ACTIVITY:    No heavy lifting after surgery    Keep the bandage in place until you are seen tomorrow     Do not get your bandage wet      PAIN MEDICATION   It is common to have some mild or moderate discomfort after eye surgery.  Tylenol or ibuprofen may be taken if you don't have any other general health conditions that prevent you from taking these.      WHAT TO EXPECT  It is common for the eye to to have a blood tinged discharge for a few days after surgery  It may feel irritated (as if something were in your eye), for there to be clear discharge (thicker in the mornings upon awakening), and for it to be bloodshot for 2-3 weeks following retina surgery.   Your vision is also commonly decreased during this time due to the bubble.      WHAT TO WATCH OUT FOR  If you experience any of the following, you should call immediately:    Increasing pain    Increasing nausea or vomiting    Increasing redness    Worsening or darkening  of the vision    New flashing lights or floaters      For any of the symptoms listed above, or for other concerns, call [phone] and ask to speak to the clinic nurse.  If you call after hours, follow to prompts to reach the doctor on call.      Same-Day Surgery   Adult Discharge Orders & Instructions     For 24 hours after surgery:  1. Get plenty of rest.  A responsible adult must stay with you for at least 24 hours after you leave the hospital.   2. Pain medication can slow your reflexes. Do not drive or use heavy equipment.  If you have weakness or tingling, don't drive or use heavy equipment until this feeling goes away.  3. Mixing alcohol and pain medication can cause dizziness and slow your breathing. It can even be fatal. Do not drink alcohol while taking pain medication.  4. Avoid strenuous or risky activities.  Ask for help when climbing stairs.   5. You may feel lightheaded.  If so, sit for a few minutes before standing.  Have someone help you get up.   6. If you have nausea (feel sick to your stomach), drink only clear liquids such as apple juice, ginger ale, broth or 7-Up.  Rest may also help.  Be sure to drink enough fluids.  Move to a regular diet as you feel able. Take pain medications with a small amount of solid food, such as toast or crackers, to avoid nausea.   7. A slight fever is normal. Call the doctor if your fever is over 100 F (37.7 C) (taken under the tongue) or lasts longer than 24 hours.  8. You may have a dry mouth, muscle aches, trouble sleeping or a sore throat.  These symptoms should go away after 24 hours.  9. Do not make important or legal decisions.   Pain Management:      1. Take pain medication (if prescribed) for pain as directed by your physician.        2. WARNING: If the pain medication you have been prescribed contains Tylenol  (acetaminophen), DO NOT take additional doses of Tylenol (acetaminophen).     Call your doctor for any of the followin.  Signs of infection  (fever, growing tenderness at the surgery site, severe pain, a large amount of drainage or bleeding, foul-smelling drainage, redness, swelling).    2.  It has been over 8 to 10 hours since surgery and you are still not able to urinate (pee).    3.  Headache for over 24 hours.    4.  Numbness, tingling or weakness the day after surgery (if you had spinal anesthesia).  To contact a doctor, call Dr. Concepcion, Eye Clinic at [phone] or:      [phone] and ask for the Resident On Call for:          __________________________________________ (answered 24 hours a day)      Emergency Department:  [place] [facility]: [phone]  [place] [facility]: [phone]

## 2021-11-26 NOTE — ANESTHESIA CARE TRANSFER NOTE
Patient: Nicholas Sultana    Procedure: Procedure(s):  Right eye:  Pars Plana vitrectomy, membrane stripping, Endolaser       Diagnosis: Vitreous hemorrhage of right eye (H) [H43.11]  Proliferative diabetic retinopathy of both eyes associated with type 2 diabetes mellitus, macular edema presence unspecified (H) [E11.3593]  Diagnosis Additional Information: No value filed.    Anesthesia Type:   MAC     Note:    Oropharynx: oropharynx clear of all foreign objects and spontaneously breathing  Level of Consciousness: awake  Oxygen Supplementation: room air    Independent Airway: airway patency satisfactory and stable  Dentition: dentition unchanged  Vital Signs Stable: post-procedure vital signs reviewed and stable  Report to RN Given: handoff report given  Patient transferred to: Phase II    Handoff Report: Identifed the Patient, Identified the Reponsible Provider, Reviewed the pertinent medical history, Discussed the surgical course, Reviewed Intra-OP anesthesia mangement and issues during anesthesia, Set expectations for post-procedure period and Allowed opportunity for questions and acknowledgement of understanding      Vitals:  Vitals Value Taken Time   /63 11/26/21 1130   Temp 37  C (98.6  F) 11/26/21 1050   Pulse 78 11/26/21 1130   Resp 16 11/26/21 1004   SpO2 99 % 11/26/21 1140   Vitals shown include unvalidated device data.    Electronically Signed By: KATHERINE Perez CRNA  November 26, 2021  11:41 AM

## 2021-11-27 ENCOUNTER — OFFICE VISIT (OUTPATIENT)
Dept: OPHTHALMOLOGY | Facility: CLINIC | Age: 66
End: 2021-11-27
Payer: COMMERCIAL

## 2021-11-27 DIAGNOSIS — Z98.890 POST-OPERATIVE STATE: Primary | ICD-10-CM

## 2021-11-27 PROCEDURE — 99024 POSTOP FOLLOW-UP VISIT: CPT | Performed by: STUDENT IN AN ORGANIZED HEALTH CARE EDUCATION/TRAINING PROGRAM

## 2021-11-27 RX ORDER — LATANOPROST 50 UG/ML
1 SOLUTION/ DROPS OPHTHALMIC AT BEDTIME
Qty: 2.5 ML | Refills: 11 | Status: SHIPPED | OUTPATIENT
Start: 2021-11-27 | End: 2022-01-04

## 2021-11-27 RX ORDER — OFLOXACIN 3 MG/ML
1-2 SOLUTION/ DROPS OPHTHALMIC 4 TIMES DAILY
Qty: 5 ML | Refills: 1 | Status: SHIPPED | OUTPATIENT
Start: 2021-11-27 | End: 2022-01-21

## 2021-11-27 RX ORDER — BRIMONIDINE TARTRATE 2 MG/ML
1 SOLUTION/ DROPS OPHTHALMIC 3 TIMES DAILY
Qty: 10 ML | Refills: 11 | Status: SHIPPED | OUTPATIENT
Start: 2021-11-27 | End: 2022-01-21

## 2021-11-27 ASSESSMENT — VISUAL ACUITY: OD_SC: 20/150

## 2021-11-27 ASSESSMENT — SLIT LAMP EXAM - LIDS: COMMENTS: NORMAL

## 2021-11-27 ASSESSMENT — EXTERNAL EXAM - RIGHT EYE: OD_EXAM: NORMAL

## 2021-11-27 ASSESSMENT — EXTERNAL EXAM - LEFT EYE: OS_EXAM: SENSORY XT

## 2021-11-27 NOTE — PATIENT INSTRUCTIONS
Drops:   - Prednisolone acetate (white) - 4x/day  - Ofloxacin (tan) - 4x/day  - Latanoprost (aqua/teal top) - at bedtime  - Brimonidine (purple top) - 2x/day    Positioning:  Keep head elevated at all times    Activity:   - keep head elevated when you sleep 2 weeks  - avoid strenuous activity for 2 weeks   - eye shield over your eye at night 1 week    Warnings:  - call us if you develop worsening vision, pain, or severe sensitivity to light    Follow up:  - Monday or Tuesday 11/29/2021 (we will call on Monday to schedule the appointment whenever he can make it)

## 2021-11-27 NOTE — PROGRESS NOTES
Ophthalmology Post-Op Note    ASSESSMENT & PLAN    # S/p PPV25/EL on 11/26/2021 for NCVH/PDR  11/27/21 - VA - 20/125, IOP 17. No significant hemorrhage, pt happy with improvement in vision. The AC paracentesis wound is Rosette positive. BCL placed (14mm [place]). Will start on aqueous suppressants and upgrade prophylactic ABX to ofloxacin. See on Monday.     #Pseudoexfoliative Glaucoma  11/27/2021 - impressive Tds noted intraoperatively are much more subtle at SL. Nerve was also noted to be glaucomatous.  Pt /w PXF material on lens surface. Pt informed he may need drops longer than post-op, but he also has significant limitations in his ability to administer drops so perhaps other options for IOP lowering can be considered in the future.       PLAN:  Drops:   - Prednisolone acetate (white) - 4x/day  - Ofloxacin (tan) - 4x/day  - Latanoprost (aqua/teal top) - at bedtime  - Brimonidine (purple top) - 2x/day    Positioning:  Keep head elevated at all times    Activity:   - keep head elevated when you sleep 2 weeks  - avoid strenuous activity for 2 weeks   - eye shield over your eye at night 1 week    Warnings:  - call us if you develop worsening vision, pain, or severe sensitivity to light    Follow up:  - Monday or Tuesday 11/29/2021 (patient will work out transportation this weekend; we will call on Monday to schedule whenever he can make it)       ATTESTATION     Attending Attestation:     Complete documentation of historical and exam elements from today's encounter can be found in the full encounter summary report (not reduplicated in this progress note).  I personally obtained the chief complaint(s) and history of present illness.  I confirmed and edited as necessary the review of systems, past medical/surgical history, family history, social history, and examination findings as documented by others; and I examined the patient myself.  I personally reviewed the relevant tests, images, and reports as documented above.   I formulated and edited as necessary the assessment and plan and discussed the findings and management plan with the patient and family      Nicholas Rodriguez MD  Retina Fellow  Department of Ophthalmology  [facility]  Pager: [phone]

## 2021-11-30 ENCOUNTER — OFFICE VISIT (OUTPATIENT)
Dept: OPHTHALMOLOGY | Facility: CLINIC | Age: 66
End: 2021-11-30
Attending: STUDENT IN AN ORGANIZED HEALTH CARE EDUCATION/TRAINING PROGRAM
Payer: COMMERCIAL

## 2021-11-30 DIAGNOSIS — E11.3593 PROLIFERATIVE DIABETIC RETINOPATHY OF BOTH EYES ASSOCIATED WITH TYPE 2 DIABETES MELLITUS, MACULAR EDEMA PRESENCE UNSPECIFIED (H): Primary | ICD-10-CM

## 2021-11-30 DIAGNOSIS — Z98.890 POST-OPERATIVE STATE: ICD-10-CM

## 2021-11-30 PROCEDURE — G0463 HOSPITAL OUTPT CLINIC VISIT: HCPCS

## 2021-11-30 PROCEDURE — 99024 POSTOP FOLLOW-UP VISIT: CPT | Performed by: STUDENT IN AN ORGANIZED HEALTH CARE EDUCATION/TRAINING PROGRAM

## 2021-11-30 ASSESSMENT — SLIT LAMP EXAM - LIDS: COMMENTS: NORMAL

## 2021-11-30 ASSESSMENT — VISUAL ACUITY
OD_SC: 20/250
OS_SC: NLP
METHOD: SNELLEN - LINEAR

## 2021-11-30 ASSESSMENT — TONOMETRY
OD_IOP_MMHG: 5
IOP_METHOD: ICARE
OS_IOP_MMHG: 38

## 2021-11-30 ASSESSMENT — EXTERNAL EXAM - LEFT EYE: OS_EXAM: SENSORY XT

## 2021-11-30 ASSESSMENT — EXTERNAL EXAM - RIGHT EYE: OD_EXAM: NORMAL

## 2021-11-30 NOTE — PATIENT INSTRUCTIONS
Drops:   - Prednisolone acetate (white) - 4x/day - on Friday decrease to 3x/day for 1 week then decrease to 2x/day  - Ofloxacin (tan) - 4x/day - STOP on Friday  - Latanoprost (aqua/teal top) - at bedtime  - Brimonidine (purple top) - 2x/day - STOP    Positioning:  Keep head elevated at all times    Activity:   - keep head elevated when you sleep 2 weeks  - avoid strenuous activity for 2 weeks   - eye shield over your eye at night 1 week    Warnings:  - call us if you develop worsening vision, pain, or severe sensitivity to light    Follow up:  2 weeks for DFE, OCT Mac

## 2021-11-30 NOTE — PROGRESS NOTES
Ophthalmology Post-Op Note    OCT Macula 11/30/21  OD: poor view. 55 degree shows flattening of foveal contour w/ likely IRF at fovea, TRD inferonasal to disc    ASSESSMENT & PLAN    # S/p PPV25/EL on 11/26/2021 for NCVH/PDR  11/27/21 - VA - 20/125, IOP 17. No significant hemorrhage, pt happy with improvement in vision. The AC paracentesis wound is Rosette positive. BCL placed (14mm [place]). Will start on aqueous suppressants and upgrade prophylactic ABX to ofloxacin. See on Monday.   11/30/21 - Doing well. Wound sealed. VA was slightly worse today, but c/w anatomy and he was wearing a BCL. OCT shows possible IRF, but he received avastin post-op just a few days ago. Will observe this. TRD is likely present on OCT nasally but this may be stable w/ recent surgery. Continue to monitor.     #Pseudoexfoliative Glaucoma  11/27/2021 - impressive Tds noted intraoperatively are much more subtle at SL. Nerve was also noted to be glaucomatous.  Pt /w PXF material on lens surface. Pt informed he may need drops longer than post-op, but he also has significant limitations in his ability to administer drops so perhaps other options for IOP lowering can be considered in the future.   11/30/21 - IOP 9 on brim/lat. K now Rosette negative. Will stop Brim.       PLAN:  Drops:   - Prednisolone acetate (white) - 4x/day - on Friday decrease to 3x/day for 1 week then decrease to 2x/day  - Ofloxacin (tan) - 4x/day - STOP on Friday  - Latanoprost (aqua/teal top) - at bedtime  - Brimonidine (purple top) - 2x/day - STOP    Positioning:  Keep head elevated at all times    Activity:   - keep head elevated when you sleep 2 weeks  - avoid strenuous activity for 2 weeks   - eye shield over your eye at night 1 week    Warnings:  - call us if you develop worsening vision, pain, or severe sensitivity to light    Follow up:  2 weeks for DFE, OCT Mac      ATTESTATION     Attending Attestation:     Complete documentation of historical and exam elements from  today's encounter can be found in the full encounter summary report (not reduplicated in this progress note).  I personally obtained the chief complaint(s) and history of present illness.  I confirmed and edited as necessary the review of systems, past medical/surgical history, family history, social history, and examination findings as documented by others; and I examined the patient myself.  I personally reviewed the relevant tests, images, and reports as documented above.  I formulated and edited as necessary the assessment and plan and discussed the findings and management plan with the patient and family      Nicholas Rodriguez MD  Retina Fellow  Department of Ophthalmology  [facility]  Pager: [phone]

## 2021-11-30 NOTE — NURSING NOTE
Chief Complaints and History of Present Illnesses   Patient presents with     Post Op (Ophthalmology) Right Eye     Post 25-Gauge  Pars Plana vitrectomy, membrane stripping, Endolaser with Right Eye Intravitreal Avastin, 11/26/2021       Chief Complaint(s) and History of Present Illness(es)     Post Op (Ophthalmology) Right Eye     Laterality: right eye    Course: stable    Associated symptoms: discharge (crusting in the corners).  Negative for eye pain and tearing    Treatments tried: eye drops    Pain scale: 0/10    Comments: Post 25-Gauge  Pars Plana vitrectomy, membrane stripping, Endolaser with Right Eye Intravitreal Avastin, 11/26/2021                Comments     Patient returns to clinic for a 4 day post operative right eye exam.  Patient denies having any eye discomfort except when using eye drops (discomfort lasts less than 2 minutes).    Drops:   - Prednisolone acetate (white) - 4x/day  - Ofloxacin (tan) - 4x/day  - Latanoprost (aqua/teal top) - at bedtime  - Brimonidine (purple top) - 3x/day    MURRAY Sampson 1:17 PM  November 30, 2021

## 2021-12-14 ENCOUNTER — OFFICE VISIT (OUTPATIENT)
Dept: OPHTHALMOLOGY | Facility: CLINIC | Age: 66
End: 2021-12-14
Attending: OPHTHALMOLOGY
Payer: COMMERCIAL

## 2021-12-14 DIAGNOSIS — E11.3593 PROLIFERATIVE DIABETIC RETINOPATHY OF BOTH EYES ASSOCIATED WITH TYPE 2 DIABETES MELLITUS, MACULAR EDEMA PRESENCE UNSPECIFIED (H): Primary | ICD-10-CM

## 2021-12-14 PROCEDURE — G0463 HOSPITAL OUTPT CLINIC VISIT: HCPCS | Mod: 25

## 2021-12-14 PROCEDURE — 92134 CPTRZ OPH DX IMG PST SGM RTA: CPT | Performed by: OPHTHALMOLOGY

## 2021-12-14 PROCEDURE — 92250 FUNDUS PHOTOGRAPHY W/I&R: CPT | Performed by: OPHTHALMOLOGY

## 2021-12-14 PROCEDURE — 99024 POSTOP FOLLOW-UP VISIT: CPT | Performed by: OPHTHALMOLOGY

## 2021-12-14 ASSESSMENT — VISUAL ACUITY
OS_SC: NLP
OD_SC: 20/250
METHOD: SNELLEN - LINEAR

## 2021-12-14 ASSESSMENT — EXTERNAL EXAM - RIGHT EYE: OD_EXAM: NORMAL

## 2021-12-14 ASSESSMENT — TONOMETRY
IOP_METHOD: ICARE
OS_IOP_MMHG: 43
OD_IOP_MMHG: 13

## 2021-12-14 ASSESSMENT — SLIT LAMP EXAM - LIDS: COMMENTS: NORMAL

## 2021-12-14 NOTE — NURSING NOTE
"No chief complaint on file.    Chief Complaint(s) and History of Present Illness(es)     Pt presents himself for a post-op visit for his right eye. Pt states that his vision is still foggy, it has improved but just slightly. He says that he can see the tv \"every once in a while\" but that most of the time the vision remains foggy. Pt reports that he occasionally will get \"wavy\" vision in his right eye in the mornings.    DM2  Glucose: 107, this morning  Lab Results       Component                Value               Date                       A1C                      7.2                 11/23/2021                 A1C                      8.2                 12/12/2007                 A1C                      9.4                 08/15/2006                 A1C                      8.9                 03/06/2006              Pt denies new floaters, flashes, distortion, double vision, or pain in either eye.  Harrison Jules OT 2:10 PM December 14, 2021          "

## 2021-12-14 NOTE — PROGRESS NOTES
"CC -   VH OD    INTERVAL HISTORY - Improving, but still a lot of trouble with ADLs, Using PF 2/day & latanoprost at bedtime OD      PMH -   Nicholas Sultana is a  66 year old year-old patient with h/o DM II and PDR OU.  Presented to [facility] 11/13/2021 with new VH OD    Lost vision OS after \"bleeding\". Was advised to have surgery OS in 2014 for NCVH but did not, subsequent vision loss to NLP unsure when.  Had PRP OU in 2014, but did not get eye exam from 2014 until new VH OD in 11/2021  He reports a hx of open bypass surgery in 2016.       PAST OCULAR SURGERY  PRP (laser per patient) OU 2014  PPV/MS/Avastin/EL OD 11/26/21 (DDK)        RETINAL IMAGING:  OCT 12-14-21  OD - tr SRF central macula better than 11/30/21, not connected to IN TRD, TRD IN macula/inferior to arcades      ASSESSMENT & PLAN    # POW #3 OD   - s/p PPV/EL/Avastin OD 11/26/21 for NCVH   - retina flat overall   - localized TRD noted IN stable   - no infection   - IOP OK      - taper PF 1/day x 1 week then stop   - continue xalatan at bedtime        # PDR with DM II OU   - s/p PRP OU 2014   - NLP OS ?d/t NVG   - NCVH OD 11/2021   - s/p PPV/EL OD 11/26/21      # TRD OD   - mostly extramacular    # NVG OS   - NLP   - no pain    # NS OU   - moderate OD   - brunescent OS   - advise CE/IOL OD soon to facilitate DFE & help vision      RTC 3 weeks, DFE OD, OCT OD, Optos OD - get images after DFE    ATTESTATION     Attending Physician Attestation:      Complete documentation of historical and exam elements from today's encounter can be found in the full encounter summary report (not reduplicated in this progress note).  I personally obtained the chief complaint(s) and history of present illness.  I confirmed and edited as necessary the review of systems, past medical/surgical history, family history, social history, and examination findings as documented by others; and I examined the patient myself.  I personally reviewed the relevant tests, images, and reports " as documented above.  I formulated and edited as necessary the assessment and plan and discussed the findings and management plan with the patient and family    Nidhi Concepcion MD, PhD  , Vitreoretinal Surgery  Department of Ophthalmology  [facility]

## 2021-12-20 DIAGNOSIS — Z98.890 POST-OPERATIVE STATE: ICD-10-CM

## 2021-12-20 RX ORDER — OFLOXACIN 3 MG/ML
1-2 SOLUTION/ DROPS OPHTHALMIC 4 TIMES DAILY
Qty: 5 ML | OUTPATIENT
Start: 2021-12-20

## 2021-12-20 NOTE — TELEPHONE ENCOUNTER
ofloxacin (OCUFLOX) 0.3 % ophthalmic solution  Last Written Prescription Date:  11/27/2021  Last Fill Quantity: 5,   # refills: 1  Last Office Visit : 12/14/2021  Future Office visit:  1/4/2022     Nidhi Concepcion MD    Ophthalmology        ASSESSMENT & PLAN     # POW #3 OD              - s/p PPV/EL/Avastin OD 11/26/21 for NCVH              - retina flat overall              - localized TRD noted IN stable              - no infection              - IOP OK                            - taper PF 1/day x 1 week then stop              - continue xalatan at bedtime           # PDR with DM II OU              - s/p PRP OU 2014              - NLP OS ?d/t NVG              - NCVH OD 11/2021              - s/p PPV/EL OD 11/26/21        # TRD OD              - mostly extramacular     # NVG OS              - NLP              - no pain     # NS OU              - moderate OD              - brunescent OS              - advise CE/IOL OD soon to facilitate DFE & help vision        RTC 3 weeks, DFE OD, OCT OD, Optos OD - get images after DFE    Routing refill request to provider for review/approval because:  Nothing mentioned in the last noted about continuing this medication for Pt care.    Please advise       Lindsay Beltran RN  Central Triage Red Flags/Med Refills

## 2022-01-03 ENCOUNTER — PATIENT OUTREACH (OUTPATIENT)
Dept: CARE COORDINATION | Facility: CLINIC | Age: 67
End: 2022-01-03
Payer: COMMERCIAL

## 2022-01-03 NOTE — PROGRESS NOTES
Social Work Follow-Up  [facility]    Data/Intervention:  Patient Name:  Nicholas Sultana  /Age:  1955 (66 year old)    Reason for Follow-Up: New Transportation Resource    Collaborated With:    jenn Mike    Intervention/Education/Resources Provided:  Patient unable to use standard taxi transportation due to poor vision. Spoke with patient and nephew to provide  additional transportation resource ([id number]). Provided education on the difference between [id number] and [employer]. Patient and nephew expressed understanding. Provided contact information and web address for [email or web address].     Assessment/Plan:  No further follow-up planned at this time. Previously provided patient/family with writer's contact information and availability.     Chetna Hines [facility]   [facility]   Phone: [phone]  Pager: [phone]

## 2022-01-04 ENCOUNTER — OFFICE VISIT (OUTPATIENT)
Dept: OPHTHALMOLOGY | Facility: CLINIC | Age: 67
End: 2022-01-04
Attending: OPHTHALMOLOGY
Payer: COMMERCIAL

## 2022-01-04 DIAGNOSIS — H26.491 OTHER SECONDARY CATARACT, RIGHT EYE: Primary | ICD-10-CM

## 2022-01-04 DIAGNOSIS — H25.11 NUCLEAR SCLEROSIS OF RIGHT EYE: ICD-10-CM

## 2022-01-04 DIAGNOSIS — E11.3593 PROLIFERATIVE DIABETIC RETINOPATHY OF BOTH EYES ASSOCIATED WITH TYPE 2 DIABETES MELLITUS, MACULAR EDEMA PRESENCE UNSPECIFIED (H): ICD-10-CM

## 2022-01-04 DIAGNOSIS — Z98.890 POST-OPERATIVE STATE: ICD-10-CM

## 2022-01-04 DIAGNOSIS — E11.3593 PROLIFERATIVE DIABETIC RETINOPATHY OF BOTH EYES ASSOCIATED WITH TYPE 2 DIABETES MELLITUS, MACULAR EDEMA PRESENCE UNSPECIFIED (H): Primary | ICD-10-CM

## 2022-01-04 DIAGNOSIS — H40.52X3 NVG (NEOVASCULAR GLAUCOMA), LEFT, SEVERE STAGE: ICD-10-CM

## 2022-01-04 PROCEDURE — 76512 OPH US DX B-SCAN: CPT | Performed by: OPHTHALMOLOGY

## 2022-01-04 PROCEDURE — 76519 ECHO EXAM OF EYE: CPT | Performed by: OPHTHALMOLOGY

## 2022-01-04 PROCEDURE — 92250 FUNDUS PHOTOGRAPHY W/I&R: CPT | Performed by: OPHTHALMOLOGY

## 2022-01-04 PROCEDURE — 999N000103 HC STATISTIC NO CHARGE FACILITY FEE

## 2022-01-04 PROCEDURE — 99024 POSTOP FOLLOW-UP VISIT: CPT | Performed by: OPHTHALMOLOGY

## 2022-01-04 PROCEDURE — 92134 CPTRZ OPH DX IMG PST SGM RTA: CPT | Performed by: OPHTHALMOLOGY

## 2022-01-04 PROCEDURE — 99214 OFFICE O/P EST MOD 30 MIN: CPT | Mod: 24 | Performed by: OPHTHALMOLOGY

## 2022-01-04 PROCEDURE — G0463 HOSPITAL OUTPT CLINIC VISIT: HCPCS

## 2022-01-04 RX ORDER — LATANOPROST 50 UG/ML
1 SOLUTION/ DROPS OPHTHALMIC AT BEDTIME
Qty: 2.5 ML | Refills: 11 | Status: SHIPPED | OUTPATIENT
Start: 2022-01-04

## 2022-01-04 ASSESSMENT — REFRACTION_MANIFEST
OD_AXIS: 127
OD_SPHERE: -0.50
OD_CYLINDER: +0.25

## 2022-01-04 ASSESSMENT — VISUAL ACUITY
METHOD: SNELLEN - LINEAR
OS_SC: NLP
METHOD: SNELLEN - LINEAR
OD_SC: 20/150
OD_SC: 20/150
OS_SC: NLP

## 2022-01-04 ASSESSMENT — SLIT LAMP EXAM - LIDS
COMMENTS: DERMATOCHALASIS, BROW PTOSIS
COMMENTS: NORMAL
COMMENTS: DERMATOCHALASIS, BROW PTOSIS

## 2022-01-04 ASSESSMENT — CONF VISUAL FIELD
METHOD: COUNTING FINGERS
OD_INFERIOR_TEMPORAL_RESTRICTION: 3
OD_SUPERIOR_NASAL_RESTRICTION: 3
OS_SUPERIOR_TEMPORAL_RESTRICTION: 1
METHOD: COUNTING FINGERS
OS_SUPERIOR_NASAL_RESTRICTION: 1
OS_INFERIOR_TEMPORAL_RESTRICTION: 1
OD_INFERIOR_NASAL_RESTRICTION: 1
OS_INFERIOR_NASAL_RESTRICTION: 1
OS_SUPERIOR_NASAL_RESTRICTION: 1
OD_SUPERIOR_NASAL_RESTRICTION: 3
OS_SUPERIOR_TEMPORAL_RESTRICTION: 1
OD_INFERIOR_TEMPORAL_RESTRICTION: 3
OD_INFERIOR_NASAL_RESTRICTION: 1
OS_INFERIOR_TEMPORAL_RESTRICTION: 1
OS_INFERIOR_NASAL_RESTRICTION: 1

## 2022-01-04 ASSESSMENT — EXTERNAL EXAM - RIGHT EYE: OD_EXAM: NORMAL

## 2022-01-04 ASSESSMENT — TONOMETRY
IOP_METHOD: TONOPEN
IOP_METHOD: TONOPEN
OD_IOP_MMHG: 15
OD_IOP_MMHG: 15

## 2022-01-04 NOTE — NURSING NOTE
Chief Complaints and History of Present Illnesses   Patient presents with     Post Op (Ophthalmology) Right Eye     Chief Complaint(s) and History of Present Illness(es)     Post Op (Ophthalmology) Right Eye     Laterality: right eye    Quality: blurred vision    Course: gradually improving    Associated symptoms: Negative for eye pain, flashes and floaters    Pain scale: 0/10              Comments     POW#5.5 s/p PPV/EL/Avastin OD 11/26/21 for NCVH  Pt feels his vision RE has improved slightly since last visit.  Ocular meds: Latanoprost at bedtime RE  Type 2 DM- BS was 112 this morning  Lab Results       Component                Value               Date                       A1C                      7.2                 11/23/2021                 A1C                      8.2                 12/12/2007                 A1C                      9.4                 08/15/2006                 A1C                      8.9                 03/06/2006              Sharon Paez OT 12:58 PM January 4, 2022

## 2022-01-04 NOTE — PROGRESS NOTES
"CC -   Post OP    INTERVAL HISTORY - POM #1.5 OD s/p PPV/MS/Avastin/EL OD 11/26/21 (DDK)  VA improving, but still a lot of trouble with ADLs, Using atanoprost at bedtime OD      PMH -   Nicholas Sultana is a  66 year old year-old patient with h/o DM II and PDR OU.  Presented to [facility] 11/13/2021 with new VH OD    Lost vision OS after \"bleeding\". Was advised to have surgery OS in 2014 for NCVH but did not, subsequent vision loss to NLP unsure when.  Had PRP OU in 2014, but did not get eye exam from 2014 until new VH OD in 11/2021  He reports a hx of open bypass surgery in 2016.       PAST OCULAR SURGERY  PRP (laser per patient) OU 2014  PPV/MS/Avastin/EL OD 11/26/21 (DDK)        RETINAL IMAGING:  OCT 1-4-21  OD - very poor scan quality today - prior scan -  tr SRF central macula better than 11/30/21, not connected to IN TRD, TRD IN macula/inferior to arcades      ASSESSMENT & PLAN    # POM # 1.5  OD   - s/p PPV/EL/Avastin OD 11/26/21 for NCVH   - retina flat overall on U/S - poor view d/t cataract & small pupil   - localized TRD noted IN stable on U/S B-scan   - no infection   - IOP OK   - continue xalatan at bedtime        # PDR with DM II OU   - s/p PRP OU 2014   - NLP OS ?d/t NVG   - NCVH OD 11/2021   - s/p PPV/EL OD 11/26/21      # TRD OD   - mostly extramacular   - stable on U/S 1/4/22      # NVG OS   - NLP   - no pain    # NS OU   - moderate OD   - brunescent OS   - advise CE/IOL OD soon to facilitate DFE & help vision      RTC 1 month DFE OD, OCT OD, Optos OD - get images after DFE    ATTESTATION     Attending Physician Attestation:      Complete documentation of historical and exam elements from today's encounter can be found in the full encounter summary report (not reduplicated in this progress note).  I personally obtained the chief complaint(s) and history of present illness.  I confirmed and edited as necessary the review of systems, past medical/surgical history, family history, social history, and " examination findings as documented by others; and I examined the patient myself.  I personally reviewed the relevant tests, images, and reports as documented above.  I formulated and edited as necessary the assessment and plan and discussed the findings and management plan with the patient and family    Nidhi Concepcion MD, PhD  , Vitreoretinal Surgery  Department of Ophthalmology  [facility]

## 2022-01-04 NOTE — PROGRESS NOTES
HPI:  Nicholas Sultana is a 66 year old male presenting for cataract evaluation.    Referred by Dr. Concepcion.  Follows with Dr. Concepcion for PDR each eye. Recently with PPV/Avastin/EL right eye 11/26/21 for nonclearing vit heme. Also with moderate cataract right eye and recommended cataract surgery to improve vision and facilitate DFE.  Left eye is NLP s/p PDR with patient reported hemorrhage left eye. Now NLP with NVG.    Right eye vision is improving. Fluctuates throughout the day. Takes some time to focus in the morning. Has improved some since retina surgery. No eye pain. Left eye does not see, no pain.  On latanoprost at bedtime right eye     Past Ocular History:  - PDR each eye s/p PRP each eye (per patient) 2014  - Left eye NLP with NVG with reported history of NCVH 2014 and recommended surgery but never followed; lost vision to NLP prior to presentation to [facility] 11/2021  - right eye PPV/Avastin/EL 11/26/21 for NCVH  - TRD right eye  - cataract left eye>OD     PMH:  DM2 diagnosed 2009   HTN  CKD  Hypercholesterolemia    SH:  Quit smoking Jan 2013.     FH:  No known family history of blindness, glaucoma, macular degeneration    ASSESSMENT and PLAN:  1. Other secondary cataract, right eye  - dense cataract right eye visually significant with decreased VA, inability to improve with refraction, decrease in vision compared to prior to NCVH several months ago, and limited view posteriorly on exam  - We discussed the risks, benefits and alternatives of cataract surgery, including risk of bleeding, infection, postoperative changes in intraocular pressure, postoperative inflammation, possibility of retinal detachment or vision loss.  Discussed need for follow up appointments after surgery and the need for postoperative drops.   We discussed guarded visual prognosis in setting of PDR, prior NCVH and TRD, limited posterior exam. We discussed that cataract surgery would be likely to brighten vision/let in more  light, and may improve visual acuity; would be expected to help facilitate fundus exam but may not improve patient's vision and he understands. We discussed increased risks of cataract surgery in postoperative eye with PDR. Informed consent was obtained.  The patient decided to proceed with CE/IOL OD.    We discussed lens options and refractive target. We discussed that by aiming for distance, will need glasses for near.  Target: -0.5    Dilates to: 4 mm --> will need iris expansion device  Alpha blockers/Flomax: None  Trauma/Pseudoxfoliation: prior PPV  Fuchs dystrophy/guttae: None    Diabetes: Yes    Cyl: unable to obtain Ks at all with IOL master; irregular and unreliable with pentacam; given poor vision and guarded visual prognosis would not pursue toric IOL; will plan for average Ks and base calculations off immersion measurements    Proceed with CE/IOL OD.    Surgical plan:  Topical  Malyugin ring  Possible Capsular tension ring  Trypan  Post-op acular    2. Proliferative diabetic retinopathy of both eyes associated with type 2 diabetes mellitus, macular edema presence unspecified (H)  - DM2 with PDR each eye s/p PRP 2014 with subsequent NCVH each eye  - last A1c 7.2 in Nov 2021  - left eye previously recommended vitrectomy for NCVH but patient deferred; subsequently lost vision (NLP on presentation to [facility] in 2021)  - presented to [facility] 11/2021 with NCVH right eye now s/p PPV/Avastin/EL 11/26/21 with Dr. Concepcion  - following with Dr. Concepcion    3. NVG (neovascular glaucoma), left, severe stage  - NLP left eye without pain with severe NVG  - dense cataract left eye; discussed with patient would not expect improvement in vision and he understands  - discussed if developing pain would address left eye but given NLP and comfortable will monitor only for now  - monitor      Follow up for CE/IOL right eye with POD1 appointment, no dilation or sooner PRN         -----------------------------------------------------------------------------------    Attestation:  Complete documentation of historical and exam elements from today's encounter can be found in the full encounter summary report (not reduplicated in this progress note). I personally obtained the chief complaint(s) and history of present illness.  I confirmed and edited as necessary the review of systems, past medical/surgical history, family history, social history, and examination findings as documented by others; and I examined the patient myself. I personally reviewed the relevant tests, images, and reports as documented above.     I formulated and edited as necessary the assessment and plan and discussed the findings and management plan with the patient and family.      Ebony Kwok MD

## 2022-01-04 NOTE — NURSING NOTE
Chief Complaints and History of Present Illnesses   Patient presents with     Cataract     Chief Complaint(s) and History of Present Illness(es)     Cataract     Laterality: right eye    Associated symptoms: blurred vision    Course: gradually improving              Comments     Pt feels his vision RE has improved slightly since last visit.   Ocular meds: Latanoprost at bedtime RE   Type 2 DM- BS was 112 this morning   Lab Results        Component                Value               Date                        A1C                      7.2                 11/23/2021                  A1C                      8.2                 12/12/2007                  A1C                      9.4                 08/15/2006                  A1C                      8.9                 03/06/2006               Sharon Paez OT 12:58 PM January 4, 2022

## 2022-01-07 PROBLEM — H26.491 OTHER SECONDARY CATARACT, RIGHT EYE: Status: ACTIVE | Noted: 2022-01-07

## 2022-01-10 NOTE — TELEPHONE ENCOUNTER
FUTURE VISIT INFORMATION      SURGERY INFORMATION:    Date: 22    Location:  or    Surgeon:  Ebony Kwok MD    Anesthesia Type:  Combined MAC with Topical    Procedure: RIGHT EYE PHACOEMULSIFICATION, CATARACT, WITH STANDARD INTRAOCULAR LENS IMPLANT INSERTION RIGHT EYE VITRECTOMY, ANTERIOR (possible)    Consult: ov     RECORDS REQUESTED FROM:       Primary Care Provider: Basilio Pacheco MD  - [facility]    Pertinent Medical History: hypertension    Most recent EKG+ Tracin21- [facility]    Most recent ECHO: 21    Most recent PFT's: 16- [facility]    Most recent Sleep Study:  3/23/16- [facility]

## 2022-01-17 DIAGNOSIS — Z11.59 ENCOUNTER FOR SCREENING FOR OTHER VIRAL DISEASES: Primary | ICD-10-CM

## 2022-01-21 ENCOUNTER — TELEPHONE (OUTPATIENT)
Dept: OPHTHALMOLOGY | Facility: CLINIC | Age: 67
End: 2022-01-21
Payer: COMMERCIAL

## 2022-01-21 DIAGNOSIS — H26.491 OTHER SECONDARY CATARACT, RIGHT EYE: Primary | ICD-10-CM

## 2022-01-21 RX ORDER — GLIPIZIDE 5 MG/1
TABLET ORAL
COMMUNITY

## 2022-01-21 RX ORDER — ATORVASTATIN CALCIUM 20 MG/1
20 TABLET, FILM COATED ORAL DAILY
COMMUNITY

## 2022-01-21 RX ORDER — KETOROLAC TROMETHAMINE 5 MG/ML
1 SOLUTION OPHTHALMIC 4 TIMES DAILY
Qty: 10 ML | Refills: 0 | Status: SHIPPED | OUTPATIENT
Start: 2022-01-21

## 2022-01-21 RX ORDER — OFLOXACIN 3 MG/ML
1 SOLUTION/ DROPS OPHTHALMIC 4 TIMES DAILY
Qty: 5 ML | Refills: 0 | Status: SHIPPED | OUTPATIENT
Start: 2022-01-21

## 2022-01-21 RX ORDER — PREDNISOLONE ACETATE 10 MG/ML
1 SUSPENSION/ DROPS OPHTHALMIC 4 TIMES DAILY
Qty: 15 ML | Refills: 1 | Status: SHIPPED | OUTPATIENT
Start: 2022-01-21

## 2022-01-21 NOTE — PHARMACY - PREOPERATIVE ASSESSMENT CENTER
Anticoagulation Note - Preoperative Assessment Center (PAC) Pharmacist     Patient was interviewed on January 21, 2022 as a part of PAC clinic appointment. The purpose of this note is to document the perioperative anticoagulation plan outlined by the providers caring for Nicholas Sultana.     Current Regimen  Anticoagulation Regimen as of January 21, 2022: Warfarin 6mg by mouth on Wednesdays and 4.5mg by mouth rest of the week  Indication: afib  Prescriber:  Dr. Pacheco  Expected Duration of therapy: indefinite  Current medications that may interact with this include: asa  INR Goal: 2-3  Recent Change in oral intake/nutrition: No  Creatinine   Date Value Ref Range Status   11/23/2021 2.53 (H) 0.66 - 1.25 mg/dL Final   12/12/2007 0.93 0.80 - 1.50 mg/dL Final       Perioperative plan  Nicholas Sultana is scheduled for RIGHT EYE PHACOEMULSIFICATION, CATARACT, WITH STANDARD INTRAOCULAR LENS IMPLANT INSERTION on 1/27/22 with Dr. Kwok and the perioperative anticoagulation plan outlined by Dr. Kwok is to continue warfarin and aspirin uninterrupted.   Resumption of anticoagulation after procedure will be based on surgery team assessment of bleeding risks and complications.  This plan may require re-assessment and modification by his primary team in the perioperative setting depending on patients clinical situation.        Randall Grayson RPH  January 21, 2022  10:44 AM

## 2022-01-21 NOTE — TELEPHONE ENCOUNTER
RANDALL [phone]      Cataract surgery planned     Surgical plan:   Topical   Malyugin ring   Possible Capsular tension ring   Trypan       Reviewed by Dr. Kwok and ok to continue Aspirin as normally takes    Updated Hue/pharmacist who verbally demonstrated understanding    Raúl Atkins RN 11:16 AM 01/21/22                [facility] Call Center    Phone Message    May a detailed message be left on voicemail: yes     Reason for Call: Other: Randall from the sx center called asking if it is okay for the pt  to continue taking Asprin with the procedure he will be having. Please call to discuss. Thank you    Action Taken: Message routed to:  [facility] ([facility] EYE    Travel Screening: Not Applicable

## 2022-01-21 NOTE — PHARMACY - PREOPERATIVE ASSESSMENT CENTER
Preoperative Assessment Center Medication History Note    Medication history completed on January 21, 2022 by this writer. See Epic admission navigator for prior to admission medications. Operating room staff will still need to confirm medications and last dose information on day of surgery.     Medication history interview sources  Patient interview: Yes  Care Everywhere records: Yes  Surescripts pharmacy refill records: Yes  Other (if applicable): None    Changes made to PTA medication list  Added: None    Deleted:   -- alphagan - finished course  -- glucophage - had diarrhea issues so no longer taking  -- lisinopril   -- ofloxacin   -- pred forte  -- simvastatin - on atorvastatin    Changed:   -- glipizide instructions updated    Additional medication history information (including reliability of information, actions taken by pharmacist):    -- No recent (within 30 days) course of antibiotics  -- No recent (within 30 days) course of systemic steroids  -- Patient declines being on any other prescription or over-the-counter medications  -- patient is on warfarin for afib goal INR 2-3. Current regimen is 6mg by mouth on Wednesdays and 4.5mg by mouth rest of the week.     Prior to Admission medications    Medication Sig Last Dose Taking? Auth Provider   acetaminophen (TYLENOL) 500 MG tablet Take 500-1,000 mg by mouth every 6 hours as needed for mild pain Taking Yes Reported, Patient   albuterol (PROAIR HFA/PROVENTIL HFA/VENTOLIN HFA) 108 (90 Base) MCG/ACT inhaler Inhale 1-2 puffs into the lungs daily as needed Taking Yes Reported, Patient   ASPIRIN 81 MG OR TABS Take 81 mg by mouth every morning  Taking Yes Nghia Borjas MD   atorvastatin (LIPITOR) 20 MG tablet Take 20 mg by mouth daily Taking Yes Unknown, Entered By History   bumetanide (BUMEX) 2 MG tablet Take 4 mg by mouth every morning Taking Yes Reported, Patient   ferrous sulfate (FEROSUL) 325 (65 Fe) MG tablet Take 325 mg by mouth 2 times daily Taking  Yes Reported, Patient   glipiZIDE (GLUCOTROL) 5 MG tablet 10mg by mouth every morning and 5mg by mouth every evening Taking Yes Unknown, Entered By History   isosorbide mononitrate (IMDUR) 30 MG 24 hr tablet Take 30 mg by mouth every morning Takes in combination with 60mg for a total of 90mg Taking Yes Reported, Patient   isosorbide mononitrate (IMDUR) 60 MG 24 hr tablet Take 1 tablet by mouth every morning Takes in combination with 30mg for a total of 90mg Taking Yes Reported, Patient   latanoprost (XALATAN) 0.005 % ophthalmic solution Place 1 drop into the right eye At Bedtime Taking Yes Nidhi Concepcion MD   linagliptin (TRADJENTA) 5 MG TABS tablet Take 5 mg by mouth every morning Taking Yes Reported, Patient   metolazone (ZAROXOLYN) 2.5 MG tablet Take 2.5 mg by mouth daily as needed Take if weight increases 3 lbs in one day OR 5 lbs in a week Taking Yes Reported, Patient   metoprolol tartrate (LOPRESSOR) 50 MG tablet Take 50 mg by mouth 2 times daily Taking Yes Reported, Patient   potassium chloride ER (MICRO-K) 10 MEQ CR capsule Take 10 mEq by mouth every morning Taking Yes Reported, Patient   warfarin ANTICOAGULANT (COUMADIN) 3 MG tablet 6mg on Wednesdays and 4.5mg by mouth rest of the week. Taking Yes Reported, Patient   ONE TOUCH ULTRA TEST VI STRP test BID-duplicate request. please fill from rx faxed to you 12-12-07   Nghia Borjas MD   VIAGRA 100 MG OR TABS ONE TABLET TAKEN AT LEAST 30 MINUTES BEFORE INTERCOURSE   Nghia Borjas MD         Medication history completed by: Randall Grayson [facility]

## 2022-01-24 ENCOUNTER — PRE VISIT (OUTPATIENT)
Dept: SURGERY | Facility: CLINIC | Age: 67
End: 2022-01-24

## 2022-01-24 ENCOUNTER — ANESTHESIA EVENT (OUTPATIENT)
Dept: SURGERY | Facility: AMBULATORY SURGERY CENTER | Age: 67
End: 2022-01-24
Payer: COMMERCIAL

## 2022-01-24 ENCOUNTER — LAB (OUTPATIENT)
Dept: LAB | Facility: CLINIC | Age: 67
End: 2022-01-24

## 2022-01-24 ENCOUNTER — LAB (OUTPATIENT)
Dept: LAB | Facility: CLINIC | Age: 67
End: 2022-01-24
Attending: OPHTHALMOLOGY
Payer: COMMERCIAL

## 2022-01-24 ENCOUNTER — OFFICE VISIT (OUTPATIENT)
Dept: SURGERY | Facility: CLINIC | Age: 67
End: 2022-01-24
Payer: COMMERCIAL

## 2022-01-24 VITALS
HEIGHT: 68 IN | OXYGEN SATURATION: 90 % | SYSTOLIC BLOOD PRESSURE: 126 MMHG | RESPIRATION RATE: 20 BRPM | TEMPERATURE: 98.4 F | WEIGHT: 206 LBS | HEART RATE: 91 BPM | BODY MASS INDEX: 31.22 KG/M2 | DIASTOLIC BLOOD PRESSURE: 72 MMHG

## 2022-01-24 DIAGNOSIS — Z11.59 ENCOUNTER FOR SCREENING FOR OTHER VIRAL DISEASES: ICD-10-CM

## 2022-01-24 DIAGNOSIS — Z01.818 PRE-OP EXAMINATION: ICD-10-CM

## 2022-01-24 DIAGNOSIS — Z01.818 PRE-OP EXAMINATION: Primary | ICD-10-CM

## 2022-01-24 DIAGNOSIS — I48.0 PAROXYSMAL ATRIAL FIBRILLATION (H): ICD-10-CM

## 2022-01-24 DIAGNOSIS — H26.491 OTHER SECONDARY CATARACT, RIGHT EYE: ICD-10-CM

## 2022-01-24 LAB
ANION GAP SERPL CALCULATED.3IONS-SCNC: 12 MMOL/L (ref 3–14)
BUN SERPL-MCNC: 93 MG/DL (ref 7–30)
CALCIUM SERPL-MCNC: 8.9 MG/DL (ref 8.5–10.1)
CHLORIDE BLD-SCNC: 103 MMOL/L (ref 94–109)
CO2 SERPL-SCNC: 23 MMOL/L (ref 20–32)
CREAT SERPL-MCNC: 2.46 MG/DL (ref 0.66–1.25)
GFR SERPL CREATININE-BSD FRML MDRD: 28 ML/MIN/1.73M2
GLUCOSE BLD-MCNC: 192 MG/DL (ref 70–99)
INR PPP: 2.4 (ref 0.85–1.15)
POTASSIUM BLD-SCNC: 4.1 MMOL/L (ref 3.4–5.3)
SODIUM SERPL-SCNC: 138 MMOL/L (ref 133–144)

## 2022-01-24 PROCEDURE — 80048 BASIC METABOLIC PNL TOTAL CA: CPT | Performed by: PATHOLOGY

## 2022-01-24 PROCEDURE — 99215 OFFICE O/P EST HI 40 MIN: CPT | Mod: 24 | Performed by: PHYSICIAN ASSISTANT

## 2022-01-24 PROCEDURE — 99000 SPECIMEN HANDLING OFFICE-LAB: CPT | Performed by: PATHOLOGY

## 2022-01-24 PROCEDURE — 85610 PROTHROMBIN TIME: CPT | Performed by: PATHOLOGY

## 2022-01-24 PROCEDURE — U0005 INFEC AGEN DETEC AMPLI PROBE: HCPCS | Mod: 90 | Performed by: PATHOLOGY

## 2022-01-24 PROCEDURE — U0003 INFECTIOUS AGENT DETECTION BY NUCLEIC ACID (DNA OR RNA); SEVERE ACUTE RESPIRATORY SYNDROME CORONAVIRUS 2 (SARS-COV-2) (CORONAVIRUS DISEASE [COVID-19]), AMPLIFIED PROBE TECHNIQUE, MAKING USE OF HIGH THROUGHPUT TECHNOLOGIES AS DESCRIBED BY CMS-2020-01-R: HCPCS | Mod: 90 | Performed by: PATHOLOGY

## 2022-01-24 PROCEDURE — 36415 COLL VENOUS BLD VENIPUNCTURE: CPT | Performed by: PATHOLOGY

## 2022-01-24 ASSESSMENT — PAIN SCALES - GENERAL: PAINLEVEL: NO PAIN (0)

## 2022-01-24 ASSESSMENT — COPD QUESTIONNAIRES
CAT_SEVERITY: MODERATE
COPD: 1

## 2022-01-24 ASSESSMENT — ENCOUNTER SYMPTOMS
SEIZURES: 0
DYSRHYTHMIAS: 1

## 2022-01-24 ASSESSMENT — NEW YORK HEART ASSOCIATION (NYHA) CLASSIFICATION: NYHA FUNCTIONAL CLASS: II

## 2022-01-24 ASSESSMENT — LIFESTYLE VARIABLES: TOBACCO_USE: 1

## 2022-01-24 ASSESSMENT — MIFFLIN-ST. JEOR: SCORE: 1688.91

## 2022-01-24 NOTE — PATIENT INSTRUCTIONS
Preparing for Your Surgery      Name:  Nicholas Sultana   MRN:  [id number]   :  1955   Today's Date:  2022         Arriving for surgery:  Surgery date:  22  Arrival time:  1015am    Restrictions due to COVID 19:    Effective 22 M [facility] is implementing a No Visitor Policy       parking is available for anyone with mobility limitations or disabilities. (Monday- Friday 7 am- 5 pm)    Please come to:    [facility]  [address] [place] [id number]    Check in on the 5th floor, Ambulatory Surgery Center.    What can I eat or drink?    -  You may eat and drink normally until 8 hours before surgery. (Until 3:00am on 22)  -  You may have clear liquids up to 4 hours before surgery. (Until 7:45am on 22 )    Examples of clear liquids:  Water  Clear broth  Juices (apple, white grape, white cranberry  and cider) without pulp  Noncarbonated, powder based beverages  (lemonade and Royal-Aid)  Sodas (Sprite, 7-Up, ginger ale and seltzer)  Coffee or tea (without milk or cream)  Gatorade    --No alcohol for at least 24 hours before surgery    Which medicines can I take?    Hold Aspirin for 7 days before surgery.   Hold Multivitamins for 7 days before surgery.  Hold Supplements for 7 days before surgery.  Hold Ibuprofen (Advil, Motrin) for 1 day before surgery--unless otherwise directed by surgeon.  Hold Naproxen (Aleve) for 4 days before surgery.    Hold Viagra for 24 hours before surgery    -  DO NOT take the following medications the day of surgery:   Ferrous Sulfate ( iron pill)   Glipizide ( Glucotrol)   Linagliptin ( Tradjenta )   Potassium chloride     -  PLEASE TAKE the following medications the day of surgery    Tylenol (acetaminophen) as needed for pain   Albuterol (proair inhaler) as needed   Asprin    Atorvastatin (Lipitor)   Imdur (isosorbide)   Metoprolol tartrate (Lopressor)   Warfarin ( coumadin)   Ketorolac (acular) eye  drops   Ofloxacin (ocuflox) eye drops   Prednisolone acetate eye drops   Bumex (bumetanide)       How do I prepare myself?  - Please take 2 showers before surgery using Scrubcare or Hibiclens soap.    Use this soap only from the neck to your toes.     Leave the soap on your skin for one minute--then rinse thoroughly.      You may use your own shampoo and conditioner; no other hair products.   - Please remove all jewelry and body piercings.  - No lotions, deodorants or fragrance.  - No makeup or fingernail polish.   - Bring your ID and insurance card.    -If you have a Deep Brain Stimulator, a Spinal Cord Stimulator or any implanted Neuro device you must bring the remote to the Surgery Center        - All patients are required to have a Covid-19 test within 4 days of surgery/procedure.      -Patients will be contacted by the [facility] scheduling team within 1 week of surgery to make an appointment.      - Patients may call the Scheduling team at [phone] if they have not been scheduled within 4 days of  surgery.      ALL PATIENTS ARE REQUIRED TO HAVE A RESPONSIBLE ADULT TO DRIVE AND BE IN ATTENDANCE WITH THEM FOR 24 HOURS FOLLOWING SURGERY       Questions or Concerns:    -For questions regarding the day of surgery please contact the Ambulatory Surgery Center at [phone].    -If you have health changes between today and your surgery please contact your surgeon.     For questions after surgery please call your surgeons office.

## 2022-01-24 NOTE — H&P
Pre-Operative H & P     CC:  Preoperative exam to assess for increased cardiopulmonary risk while undergoing surgery and anesthesia.    Date of Encounter: 1/24/2022  Primary Care Physician:  Basilio Pacheco     Reason for visit:   Encounter Diagnoses   Name Primary?     Pre-op examination Yes     Other secondary cataract, right eye      Paroxysmal atrial fibrillation (H)        HPI  Nicholas Sultana is a 66 year old male who presents for pre-operative H & P in preparation for  Procedure Information     Case: [id number] Date/Time: 01/27/22 1150    Procedure: RIGHT EYE PHACOEMULSIFICATION, CATARACT, WITH STANDARD INTRAOCULAR LENS IMPLANT INSERTION (Right Eye)    Anesthesia type: Combined MAC with Topical    Diagnosis: Other secondary cataract, right eye [H26.491]    Pre-op diagnosis: Other secondary cataract, right eye [H26.491]    Location: [place] [id number] / [facility]    Providers: Ebony Kwok MD          History is obtained from the patient and chart review    Hx of abnormal bleeding or anti-platelet use: Coumadin/ ASA 81 mg - continue      Past Medical History  Past Medical History:   Diagnosis Date     Anemia      CAD (coronary artery disease)      CHF (congestive heart failure) (H)      Chronic ulcer of other specified site     right foot     CKD (chronic kidney disease) stage 4, GFR 15-29 ml/min (H)      COPD (chronic obstructive pulmonary disease) (H)      Gout      Obesity      YAS (obstructive sleep apnea)      Paroxysmal atrial fibrillation (H)      Tobacco use disorder      Type 2 diabetes mellitus (H)      Type II or unspecified type diabetes mellitus without mention of complication, not stated as uncontrolled      Unspecified chronic bronchitis (H)     recurrant     Unspecified essential hypertension        Past Surgical History  Past Surgical History:   Procedure Laterality Date     BYPASS GRAFT ARTERY CORONARY  2016    x5     INTRAVITREAL INJECTION  GAS/TPA/METHOTREXATE/ANTIBIOTICS Right 11/26/2021    Procedure: Right Eye Intravitreal Avastin;  Surgeon: Nidhi Concepcion MD;  Location: [facility] OR     righ foot partial amputation Right 2017     TONSILLECTOMY      as child     VITRECTOMY PARSPLANA WITH 25 GAUGE SYSTEM Right 11/26/2021    Procedure: Right eye 25-Gauge  Pars Plana vitrectomy, membrane stripping, Endolaser;  Surgeon: Nidhi Concepcion MD;  Location: [facility] OR     ZZC EXC PRES ULCER UNLISTED      I&D of ulcer 2005       Prior to Admission Medications  Current Outpatient Medications   Medication Sig Dispense Refill     acetaminophen (TYLENOL) 500 MG tablet Take 500-1,000 mg by mouth every 6 hours as needed for mild pain       albuterol (PROAIR HFA/PROVENTIL HFA/VENTOLIN HFA) 108 (90 Base) MCG/ACT inhaler Inhale 1-2 puffs into the lungs daily as needed       ASPIRIN 81 MG OR TABS Take 81 mg by mouth every morning  100 3     atorvastatin (LIPITOR) 20 MG tablet Take 20 mg by mouth daily       bumetanide (BUMEX) 2 MG tablet Take 4 mg by mouth every morning       ferrous sulfate (FEROSUL) 325 (65 Fe) MG tablet Take 325 mg by mouth 2 times daily       glipiZIDE (GLUCOTROL) 5 MG tablet 10mg by mouth every morning and 5mg by mouth every evening       isosorbide mononitrate (IMDUR) 30 MG 24 hr tablet Take 30 mg by mouth every morning Takes in combination with 60mg for a total of 90mg       isosorbide mononitrate (IMDUR) 60 MG 24 hr tablet Take 1 tablet by mouth every morning Takes in combination with 30mg for a total of 90mg       latanoprost (XALATAN) 0.005 % ophthalmic solution Place 1 drop into the right eye At Bedtime 2.5 mL 11     linagliptin (TRADJENTA) 5 MG TABS tablet Take 5 mg by mouth every morning       metolazone (ZAROXOLYN) 2.5 MG tablet Take 2.5 mg by mouth daily as needed Take if weight increases 3 lbs in one day OR 5 lbs in a week       metoprolol tartrate (LOPRESSOR) 50 MG tablet Take 50 mg by mouth 2 times daily       potassium chloride  ER (MICRO-K) 10 MEQ CR capsule Take 10 mEq by mouth every morning       warfarin ANTICOAGULANT (COUMADIN) 3 MG tablet 6mg on  and 4.5mg by mouth rest of the week.       ketorolac (ACULAR) 0.5 % ophthalmic solution Place 1 drop into the right eye 4 times daily 10 mL 0     ofloxacin (OCUFLOX) 0.3 % ophthalmic solution Place 1 drop into the right eye 4 times daily 5 mL 0     ONE TOUCH ULTRA TEST VI STRP test BID-duplicate request. please fill from rx faxed to you 07 3 months 1 year     prednisoLONE acetate (PRED FORTE) 1 % ophthalmic suspension Place 1 drop into the right eye 4 times daily 15 mL 1     VIAGRA 100 MG OR TABS ONE TABLET TAKEN AT LEAST 30 MINUTES BEFORE INTERCOURSE 6 6       Allergies  Allergies   Allergen Reactions     Other Drug Allergy (See Comments) Muscle Pain (Myalgia)     Statins per pt       Social History  Social History     Socioeconomic History     Marital status:      Spouse name: Not on file     Number of children: Not on file     Years of education: Not on file     Highest education level: Not on file   Occupational History     Not on file   Tobacco Use     Smoking status: Former Smoker     Packs/day: 1.50     Years: 15.00     Pack years: 22.50     Types: Cigarettes     Quit date: 2013     Years since quittin.9     Smokeless tobacco: Never Used   Substance and Sexual Activity     Alcohol use: Not Currently     Drug use: No     Sexual activity: Not Currently   Other Topics Concern     Not on file   Social History Narrative     Not on file     Social Determinants of Health     Financial Resource Strain: Not on file   Food Insecurity: Not on file   Transportation Needs: Not on file   Physical Activity: Not on file   Stress: Not on file   Social Connections: Not on file   Intimate Partner Violence: Not on file   Housing Stability: Not on file       Family History  Family History   Problem Relation Age of Onset     Hypertension Mother      Diabetes Mother       Kidney Disease Father      Glaucoma No family hx of      Macular Degeneration No family hx of      Anesthesia Reaction No family hx of      Bleeding Disorder No family hx of      Clotting Disorder No family hx of        Review of Systems  The complete review of systems is negative other than noted in the HPI or here.   ROS/MED HX  ENT/Pulmonary: Comment: Asbestos plaques    Left eye blindness    (+) sleep apnea, doesn't use CPAP, tobacco use, Past use, 22  Pack-Year Hx,  moderate,  COPD,     Neurologic:  - neg neurologic ROS  (-) no seizures and no CVA   Cardiovascular:     (+) Dyslipidemia hypertension--CAD -CABG-date: 9/2016. -Taking blood thinners Instructions Given to patient: Countinue coumadin and ASA 81 mg. CHF etiology: chronic diastolic HF Last EF: 55-60% date: 2016 NYHA classification: II. dysrhythmias, a-fib, pulmonary hypertension, Previous cardiac testing   Echo: Date: 9/15/2016 Results:  Final Conclusion    Limited echo for EF.    Definity was used to enhance endocardial definition secondary to suboptimal image quality.    Normal left ventricular ejection fraction estimated at 55-60%.    Estimated RV systolic pressure of 58.4 mmHg + RA pressure.    No pericardial effusion.    An effusion (pericardial) was seen on the previous echo.  No significant effusion is seen on   today's study.    Estimated EF: 55-60%    FINDINGS    Left Ventricle Normal left ventricular size and systolic function. Normal left ventricular   ejection fraction estimated at 55-60%.    Right Ventricle Right ventricle not well visualized.    Left Atrium Not applicable.    Right Atrium Not applicable.    IA Septum: Not applicable.    IVC Not applicable.    Aortic Valve Not applicable.    Mitral Valve Not applicable.    Tricuspid Valve Trace tricuspid regurgitation. Estimated RV systolic pressure of 58.4 mmHg +   RA pressure.    Pulmonic Valve Not applicable.    Aorta Not applicable.    Pericardium No pericardial effusion.     Stress  "Test: Date: Results:    ECG Reviewed: Date: 8/4/21 Results:  Normal sinus rhythm   Right superior axis deviation   Right ventricular hypertrophy   Prolonged QT (390/482)  Abnormal ECG   Cath: Date: Results:      METS/Exercise Tolerance: 1 - Eating, dressing Comment: <4   Hematologic:     (+) anemia, history of blood transfusion, no previous transfusion reaction,  (-) history of blood clots   Musculoskeletal: Comment: Partial right foot amputation    Gout right knee  (+) arthritis,     GI/Hepatic:    (-) GERD   Renal/Genitourinary: Comment: Creatinine 3.17, GFR 24 (11/13/21)    (+) renal disease, type: CRI, Pt does not require dialysis,     Endo: Comment: Hypoglycemic episode 8/2021    (+) type II DM, Last HgA1c: 7.2, date: 11/23/21, Not using insulin, - not using insulin pump. Obesity,  (-) thyroid disease   Psychiatric/Substance Use:  - neg psychiatric ROS     Infectious Disease:  - neg infectious disease ROS     Malignancy:  - neg malignancy ROS     Other:  - neg other ROS           /72 (BP Location: Right arm, Patient Position: Chair, Cuff Size: Adult Large)   Pulse 91   Temp 98.4  F (36.9  C) (Oral)   Resp 20   Ht 1.727 m (5' 8\")   Wt 93.4 kg (206 lb)   SpO2 90%   BMI 31.32 kg/m      Physical Exam   Constitutional: Awake, alert, cooperative, no apparent distress, and appears stated age.  Eyes: sclera clear, conjunctiva normal.  HENT: Normocephalic, oral pharynx with moist mucus membranes, dentures. No goiter appreciated.   Respiratory: Clear to auscultation bilaterally, no crackles or wheezing.  Cardiovascular: Regular rate and rhythm, normal S1 and S2, and no murmur noted.  Carotids +2, no bruits. No edema. Palpable pulses to radial arteries.   GI: Normal bowel sounds, soft, non-distended, non-tender, exam limited secondary to body habitus  Lymph/Hematologic: No cervical lymphadenopathy and no supraclavicular lymphadenopathy.  Genitourinary:  defer  Skin: Warm and dry.  No rashes at anticipated " surgical site.   Musculoskeletal: Limited ROM of neck. There is no redness, warmth, or swelling of the joints.   Neurologic: Awake, alert, oriented to name, place and time. Cranial nerves II-XII are grossly intact. Gait is impaired - uses cane  Neuropsychiatric: Calm, cooperative. Normal affect.     Prior Labs/Diagnostic Studies   All labs and imaging personally reviewed     EKG 8/4/21  Normal sinus rhythm   Right superior axis deviation   Right ventricular hypertrophy   Prolonged QT   Abnormal ECG    Echo 2016  Final Conclusion    Limited echo for EF.    Definity was used to enhance endocardial definition secondary to suboptimal image quality.    Normal left ventricular ejection fraction estimated at 55-60%.    Estimated RV systolic pressure of 58.4 mmHg + RA pressure.    No pericardial effusion.    An effusion (pericardial) was seen on the previous echo.  No significant effusion is seen on   today's study.    Estimated EF: 55-60%     The patient's records and results personally reviewed by this provider.     Outside records reviewed from: Care Everywhere    LAB/DIAGNOSTIC STUDIES TODAY:   Results for DANITA KENDALL (MRN [id number]) as of 1/25/2022 07:20   Ref. Range 1/24/2022 16:19   Sodium Latest Ref Range: 133 - 144 mmol/L 138   Potassium Latest Ref Range: 3.4 - 5.3 mmol/L 4.1   Chloride Latest Ref Range: 94 - 109 mmol/L 103   Carbon Dioxide Latest Ref Range: 20 - 32 mmol/L 23   Urea Nitrogen Latest Ref Range: 7 - 30 mg/dL 93 (H)   Creatinine Latest Ref Range: 0.66 - 1.25 mg/dL 2.46 (H)   GFR Estimate Latest Ref Range: >60 mL/min/1.73m2 28 (L)   Calcium Latest Ref Range: 8.5 - 10.1 mg/dL 8.9   Anion Gap Latest Ref Range: 3 - 14 mmol/L 12   Glucose Latest Ref Range: 70 - 99 mg/dL 192 (H)   INR Latest Ref Range: 0.85 - 1.15  2.40 (H)   Results for DANITA KENDALL (MRN [id number]) as of 1/25/2022 07:20   Ref. Range 1/24/2022 16:19   Sodium Latest Ref Range: 133 - 144 mmol/L 138   Potassium Latest Ref Range:  3.4 - 5.3 mmol/L 4.1   Chloride Latest Ref Range: 94 - 109 mmol/L 103   Carbon Dioxide Latest Ref Range: 20 - 32 mmol/L 23   Urea Nitrogen Latest Ref Range: 7 - 30 mg/dL 93 (H)   Creatinine Latest Ref Range: 0.66 - 1.25 mg/dL 2.46 (H)   GFR Estimate Latest Ref Range: >60 mL/min/1.73m2 28 (L)   Calcium Latest Ref Range: 8.5 - 10.1 mg/dL 8.9   Anion Gap Latest Ref Range: 3 - 14 mmol/L 12   Glucose Latest Ref Range: 70 - 99 mg/dL 192 (H)   INR Latest Ref Range: 0.85 - 1.15  2.40 (H)   Patient's INR at goal between 2.0-3.0. Creatinine remains elevated.         Assessment      Nicholas Sultana is a 66 year old male was seen as a PAC referral for risk assessment and optimization for anesthesia.    Plan/Recommendations  Pt will be optimized for the proposed procedure.  See below for details on the assessment, risk, and preoperative recommendations    NEUROLOGY  - No history of TIA, CVA or seizure  -Post Op delirium risk factors:  No risk identified    ENT  - No current airway concerns.  Will need to be reassessed day of surgery.    CARDIAC  - CAD- s/p CABG 5V in 2016. Continue ASA 81 mg  - CHF  With ischemic cardiomyopathy - With preserved EF - Echo in 2016 shows EF 55-60%. Continue bumex.   - Afib  [id number] score:  5 - the patient is on coumadin and may continue for procedure.   - Hypertension  Well controlled - continue imdur, metoprolol.   - Hyperlipidemia  Well controlled on home regimen - continue lipitor.   ~ RVSP was 58.4 mmHg on echo in 2016. The patient denies any new cardiac symptoms with edema, shortness of breath or chest pain.   - METS (Metabolic Equivalents)  Patient CANNOT perform 4 METS exercise without symptoms  Total Score: 1           Functional Capacity: Unable to complete 4 METS      RCRI-High risk: Class 4  >11% complication reate  Total Score: 4           RCRI: Cardiac Risks     RCRI: CAD    RCRI: CHF    RCRI: Elevated Creatinine       For low risk procedure the patient is okay to proceed. He  is due for follow up with outside cardiologist and echo on 2/2022.     PULMONARY  - Obstructive Sleep Apnea  YAS without home CPAP use. consideration for close monitoring of airway.   YAS Low Risk  Total Score: 2           YAS: Over 50 ys old    YAS: Male      - COPD  Not on home oxygen - the patient was seen by pulmonologist in 2016 and was given albuterol and spiriva but reports he doesn't use it because he doesn't have symptoms. The patient was initially 90% on RA and recheck was 93-97% on RA sitting up and 93% laying flat.   - Tobacco History      History   Smoking Status     Former Smoker     Packs/day: 1.50     Years: 15.00     Types: Cigarettes     Quit date: 1/30/2013   Smokeless Tobacco     Never Used       GI  PONV Low Risk  Total Score: 1           1 AN PONV: Patient is not a current smoker        /RENAL  - Baseline Creatinine  2.53 on 11/23/21. Will recheck kidney function today. Consideration for close monitoring of fluid status and avoid nephrotoxins.   ~ ED - hold viagra 24 hours prior.     ENDOCRINE  - BMI: Body mass index is 31.32 kg/m .  Obesity (BMI >30)  - Diabetes  Diabetes Mellitus, Type 2, non-insulin dependent.  Hold morning oral hypoglycemic medications. Recommend close monitoring of the patient's blood glucose levels throughout the perioperative period. Last A1c was 7.2 on 11/23/21.   ~ Gout - left knee - consideration for careful positioning     HEME  VTE Low Risk 0.5%  Total Score: 3           VTE: Greater than 59 yrs old    VTE: Male    ~ Anemia - hgb was 12.5 on 11/23/21. Low bleeding risk procedure.     MSK  ~ s/p partial right foot amputation - the patient uses a cane. Fall precautions as indicated.     ANESTHESIA  - the patient had MAC anesthesia on 11/26/21 without issues. Given all of the patient's co-morbidities after discussion with Dr. Hood have just sent a message to Dr. Dmitri Larson to make sure he's okay to proceed as the ASC. Currently is able to lay flat and has no other  exclusion criteria. ** Giles from Dr. Larson and the patient is okay to proceed at the ASC**    The patient is optimized for their procedure. AVS with information on surgery time/arrival time, meds and NPO status given by nursing staff.        The patient was discussed with Dr. Hood.     On the day of service:     Prep time: 19 minutes  Visit time: 18 minutes  Documentation time: 31 minutes  ------------------------------------------  Total time: 68 minutes      Blanca Swenson PA-C  Preoperative Assessment Center  [facility]  Phone: [phone]  Fax: [phone]

## 2022-01-24 NOTE — ANESTHESIA PREPROCEDURE EVALUATION
Anesthesia Pre-Procedure Evaluation    Patient: Nicholas Sultana   MRN: [id number] : 1955        Preoperative Diagnosis: Other secondary cataract, right eye [H26.491]    Procedure : Procedure(s):  RIGHT EYE PHACOEMULSIFICATION, CATARACT, WITH STANDARD INTRAOCULAR LENS IMPLANT INSERTION  PAC EVALUATION       Past Medical History:   Diagnosis Date     CAD (coronary artery disease)      Chronic ulcer of other specified site     right foot     Tobacco use disorder      Type II or unspecified type diabetes mellitus without mention of complication, not stated as uncontrolled      Unspecified chronic bronchitis (H)     recurrant     Unspecified essential hypertension       Past Surgical History:   Procedure Laterality Date     BYPASS GRAFT ARTERY CORONARY  2016    x5     INTRAVITREAL INJECTION GAS/TPA/METHOTREXATE/ANTIBIOTICS Right 2021    Procedure: Right Eye Intravitreal Avastin;  Surgeon: Nidhi Concepcion MD;  [facility]     righ foot partial amputation Right 2017     TONSILLECTOMY      as child     VITRECTOMY PARSPLANA WITH 25 GAUGE SYSTEM Right 2021    Procedure: Right eye 25-Gauge  Pars Plana vitrectomy, membrane stripping, Endolaser;  Surgeon: Nidhi Concepcion MD;  [facility]     ZZC EXC PRES ULCER UNLISTED      I&D of ulcer       Allergies   Allergen Reactions     Other Drug Allergy (See Comments) Muscle Pain (Myalgia)     Statins per pt      Social History     Tobacco Use     Smoking status: Former Smoker     Packs/day: 1.50     Years: 15.00     Pack years: 22.50     Types: Cigarettes     Quit date: 2013     Years since quittin.9     Smokeless tobacco: Never Used   Substance Use Topics     Alcohol use: Not Currently      Wt Readings from Last 1 Encounters:   22 93.4 kg (206 lb)        Anesthesia Evaluation   Pt has had prior anesthetic. Type: MAC and General.    No history of anesthetic complications       ROS/MED HX  ENT/Pulmonary: Comment:  Asbestos plaques    Left eye blindness    (+) sleep apnea, doesn't use CPAP, tobacco use, Past use, 22  Pack-Year Hx,  moderate,  COPD,     Neurologic:  - neg neurologic ROS  (-) no seizures and no CVA   Cardiovascular:     (+) Dyslipidemia hypertension--CAD -CABG-date: 9/2016. -Taking blood thinners Instructions Given to patient: Countinue coumadin and ASA 81 mg. CHF etiology: chronic diastolic HF Last EF: 55-60% date: 2016 NYHA classification: II. dysrhythmias, a-fib, pulmonary hypertension, Previous cardiac testing   Echo: Date: 9/15/2016 Results:  Final Conclusion    Limited echo for EF.    Definity was used to enhance endocardial definition secondary to suboptimal image quality.    Normal left ventricular ejection fraction estimated at 55-60%.    Estimated RV systolic pressure of 58.4 mmHg + RA pressure.    No pericardial effusion.    An effusion (pericardial) was seen on the previous echo.  No significant effusion is seen on   today's study.    Estimated EF: 55-60%    FINDINGS    Left Ventricle Normal left ventricular size and systolic function. Normal left ventricular   ejection fraction estimated at 55-60%.    Right Ventricle Right ventricle not well visualized.    Left Atrium Not applicable.    Right Atrium Not applicable.    IA Septum: Not applicable.    IVC Not applicable.    Aortic Valve Not applicable.    Mitral Valve Not applicable.    Tricuspid Valve Trace tricuspid regurgitation. Estimated RV systolic pressure of 58.4 mmHg +   RA pressure.    Pulmonic Valve Not applicable.    Aorta Not applicable.    Pericardium No pericardial effusion.     Stress Test: Date: Results:    ECG Reviewed: Date: 8/4/21 Results:  Normal sinus rhythm   Right superior axis deviation   Right ventricular hypertrophy   Prolonged QT (390/482)  Abnormal ECG   Cath: Date: Results:      METS/Exercise Tolerance: 1 - Eating, dressing Comment: <4   Hematologic:     (+) anemia, history of blood transfusion, no previous transfusion  reaction,  (-) history of blood clots   Musculoskeletal: Comment: Partial right foot amputation    Gout right knee  (+) arthritis,     GI/Hepatic:    (-) GERD   Renal/Genitourinary: Comment: Creatinine 3.17, GFR 24 (11/13/21)    (+) renal disease, type: CRI, Pt does not require dialysis,     Endo: Comment: Hypoglycemic episode 8/2021    (+) type II DM, Last HgA1c: 7.2, date: 11/23/21, Not using insulin, - not using insulin pump. Obesity,  (-) thyroid disease   Psychiatric/Substance Use:  - neg psychiatric ROS     Infectious Disease:  - neg infectious disease ROS     Malignancy:  - neg malignancy ROS     Other:  - neg other ROS          Physical Exam    Airway  airway exam normal      Mallampati: II   TM distance: > 3 FB   Neck ROM: full   Mouth opening: > 3 cm    Respiratory Devices and Support         Dental  no notable dental history         Cardiovascular          Rhythm and rate: regular and normal     Pulmonary   pulmonary exam normal        breath sounds clear to auscultation           OUTSIDE LABS:  CBC:   Lab Results   Component Value Date    WBC 10.6 11/23/2021    HGB 12.5 (L) 11/23/2021    HCT 40.3 11/23/2021     11/23/2021     BMP:   Lab Results   Component Value Date     11/23/2021     (L) 01/02/2018    POTASSIUM 4.7 11/23/2021    POTASSIUM 4.4 01/02/2018    CHLORIDE 103 11/23/2021    CHLORIDE 100 01/02/2018    CO2 27 11/23/2021    CO2 21 (L) 01/02/2018     (H) 11/23/2021    BUN 61 (H) 01/02/2018    CR 2.53 (H) 11/23/2021    CR 2.08 (H) 01/02/2018     (H) 11/26/2021     (H) 11/23/2021     COAGS:   Lab Results   Component Value Date    INR 1.27 (H) 11/26/2021     POC: No results found for: BGM, HCG, HCGS  HEPATIC:   Lab Results   Component Value Date    ALBUMIN 3.9 12/12/2007    PROTTOTAL 7.1 12/12/2007    ALT 21 12/12/2007    AST 22 12/12/2007    ALKPHOS 110 12/12/2007    BILITOTAL 0.4 12/12/2007     OTHER:   Lab Results   Component Value Date    A1C 7.2 (H)  11/23/2021    ENA 9.7 11/23/2021       Anesthesia Plan    ASA Status:  3   NPO Status:  NPO Appropriate    Anesthesia Type: MAC.     - Reason for MAC: immobility needed, straight local not clinically adequate   Induction: Intravenous.           Consents    Anesthesia Plan(s) and associated risks, benefits, and realistic alternatives discussed. Questions answered and patient/representative(s) expressed understanding.    - Discussed:     - Discussed with:  Patient      - Extended Intubation/Ventilatory Support Discussed: No.      - Patient is DNR/DNI Status: No    Use of blood products discussed: No .     Postoperative Care    Pain management: IV analgesics, Oral pain medications, Multi-modal analgesia.   PONV prophylaxis: Ondansetron (or other 5HT-3)     Comments:                Blanca Swenson PA-C

## 2022-01-24 NOTE — H&P (VIEW-ONLY)
Pre-Operative H & P     CC:  Preoperative exam to assess for increased cardiopulmonary risk while undergoing surgery and anesthesia.    Date of Encounter: 1/24/2022  Primary Care Physician:  Basilio Pacheco     Reason for visit:   Encounter Diagnoses   Name Primary?     Pre-op examination Yes     Other secondary cataract, right eye      Paroxysmal atrial fibrillation (H)        HPI  Nicholas Sultana is a 66 year old male who presents for pre-operative H & P in preparation for  Procedure Information     Case: [id number] Date/Time: 01/27/22 1150    Procedure: RIGHT EYE PHACOEMULSIFICATION, CATARACT, WITH STANDARD INTRAOCULAR LENS IMPLANT INSERTION (Right Eye)    Anesthesia type: Combined MAC with Topical    Diagnosis: Other secondary cataract, right eye [H26.491]    Pre-op diagnosis: Other secondary cataract, right eye [H26.491]    Location: [place] [address] / [facility]    Providers: Ebony Kwok MD          History is obtained from the patient and chart review    Hx of abnormal bleeding or anti-platelet use: Coumadin/ ASA 81 mg - continue      Past Medical History  Past Medical History:   Diagnosis Date     Anemia      CAD (coronary artery disease)      CHF (congestive heart failure) (H)      Chronic ulcer of other specified site     right foot     CKD (chronic kidney disease) stage 4, GFR 15-29 ml/min (H)      COPD (chronic obstructive pulmonary disease) (H)      Gout      Obesity      YAS (obstructive sleep apnea)      Paroxysmal atrial fibrillation (H)      Tobacco use disorder      Type 2 diabetes mellitus (H)      Type II or unspecified type diabetes mellitus without mention of complication, not stated as uncontrolled      Unspecified chronic bronchitis (H)     recurrant     Unspecified essential hypertension        Past Surgical History  Past Surgical History:   Procedure Laterality Date     BYPASS GRAFT ARTERY CORONARY  2016    x5     INTRAVITREAL INJECTION  GAS/TPA/METHOTREXATE/ANTIBIOTICS Right 11/26/2021    Procedure: Right Eye Intravitreal Avastin;  Surgeon: Nidhi Concepcion MD;  Location: [facility] OR     righ foot partial amputation Right 2017     TONSILLECTOMY      as child     VITRECTOMY PARSPLANA WITH 25 GAUGE SYSTEM Right 11/26/2021    Procedure: Right eye 25-Gauge  [place]ana vitrectomy, membrane stripping, Endolaser;  Surgeon: Nidhi Concepcion MD;  Location: [facility] OR     ZZC EXC PRES ULCER UNLISTED      I&D of ulcer 2005       Prior to Admission Medications  Current Outpatient Medications   Medication Sig Dispense Refill     acetaminophen (TYLENOL) 500 MG tablet Take 500-1,000 mg by mouth every 6 hours as needed for mild pain       albuterol (PROAIR HFA/PROVENTIL HFA/VENTOLIN HFA) 108 (90 Base) MCG/ACT inhaler Inhale 1-2 puffs into the lungs daily as needed       ASPIRIN 81 MG OR TABS Take 81 mg by mouth every morning  100 3     atorvastatin (LIPITOR) 20 MG tablet Take 20 mg by mouth daily       bumetanide (BUMEX) 2 MG tablet Take 4 mg by mouth every morning       ferrous sulfate (FEROSUL) 325 (65 Fe) MG tablet Take 325 mg by mouth 2 times daily       glipiZIDE (GLUCOTROL) 5 MG tablet 10mg by mouth every morning and 5mg by mouth every evening       isosorbide mononitrate (IMDUR) 30 MG 24 hr tablet Take 30 mg by mouth every morning Takes in combination with 60mg for a total of 90mg       isosorbide mononitrate (IMDUR) 60 MG 24 hr tablet Take 1 tablet by mouth every morning Takes in combination with 30mg for a total of 90mg       latanoprost (XALATAN) 0.005 % ophthalmic solution Place 1 drop into the right eye At Bedtime 2.5 mL 11     linagliptin (TRADJENTA) 5 MG TABS tablet Take 5 mg by mouth every morning       metolazone (ZAROXOLYN) 2.5 MG tablet Take 2.5 mg by mouth daily as needed Take if weight increases 3 lbs in one day OR 5 lbs in a week       metoprolol tartrate (LOPRESSOR) 50 MG tablet Take 50 mg by mouth 2 times daily       potassium chloride  ER (MICRO-K) 10 MEQ CR capsule Take 10 mEq by mouth every morning       warfarin ANTICOAGULANT (COUMADIN) 3 MG tablet 6mg on  and 4.5mg by mouth rest of the week.       ketorolac (ACULAR) 0.5 % ophthalmic solution Place 1 drop into the right eye 4 times daily 10 mL 0     ofloxacin (OCUFLOX) 0.3 % ophthalmic solution Place 1 drop into the right eye 4 times daily 5 mL 0     ONE TOUCH ULTRA TEST VI STRP test BID-duplicate request. please fill from rx faxed to you 07 3 months 1 year     prednisoLONE acetate (PRED FORTE) 1 % ophthalmic suspension Place 1 drop into the right eye 4 times daily 15 mL 1     VIAGRA 100 MG OR TABS ONE TABLET TAKEN AT LEAST 30 MINUTES BEFORE INTERCOURSE 6 6       Allergies  Allergies   Allergen Reactions     Other Drug Allergy (See Comments) Muscle Pain (Myalgia)     Statins per pt       Social History  Social History     Socioeconomic History     Marital status:      Spouse name: Not on file     Number of children: Not on file     Years of education: Not on file     Highest education level: Not on file   Occupational History     Not on file   Tobacco Use     Smoking status: Former Smoker     Packs/day: 1.50     Years: 15.00     Pack years: 22.50     Types: Cigarettes     Quit date: 2013     Years since quittin.9     Smokeless tobacco: Never Used   Substance and Sexual Activity     Alcohol use: Not Currently     Drug use: No     Sexual activity: Not Currently   Other Topics Concern     Not on file   Social History Narrative     Not on file     Social Determinants of Health     Financial Resource Strain: Not on file   Food Insecurity: Not on file   Transportation Needs: Not on file   Physical Activity: Not on file   Stress: Not on file   Social Connections: Not on file   Intimate Partner Violence: Not on file   Housing Stability: Not on file       Family History  Family History   Problem Relation Age of Onset     Hypertension Mother      Diabetes Mother       Kidney Disease Father      Glaucoma No family hx of      Macular Degeneration No family hx of      Anesthesia Reaction No family hx of      Bleeding Disorder No family hx of      Clotting Disorder No family hx of        Review of Systems  The complete review of systems is negative other than noted in the HPI or here.   ROS/MED HX  ENT/Pulmonary: Comment: Asbestos plaques    Left eye blindness    (+) sleep apnea, doesn't use CPAP, tobacco use, Past use, 22  Pack-Year Hx,  moderate,  COPD,     Neurologic:  - neg neurologic ROS  (-) no seizures and no CVA   Cardiovascular:     (+) Dyslipidemia hypertension--CAD -CABG-date: 9/2016. -Taking blood thinners Instructions Given to patient: Countinue coumadin and ASA 81 mg. CHF etiology: chronic diastolic HF Last EF: 55-60% date: 2016 NYHA classification: II. dysrhythmias, a-fib, pulmonary hypertension, Previous cardiac testing   Echo: Date: 9/15/2016 Results:  Final Conclusion    Limited echo for EF.    Definity was used to enhance endocardial definition secondary to suboptimal image quality.    Normal left ventricular ejection fraction estimated at 55-60%.    Estimated RV systolic pressure of 58.4 mmHg + RA pressure.    No pericardial effusion.    An effusion (pericardial) was seen on the previous echo.  No significant effusion is seen on   today's study.    Estimated EF: 55-60%    FINDINGS    Left Ventricle Normal left ventricular size and systolic function. Normal left ventricular   ejection fraction estimated at 55-60%.    Right Ventricle Right ventricle not well visualized.    Left Atrium Not applicable.    Right Atrium Not applicable.    IA Septum: Not applicable.    IVC Not applicable.    Aortic Valve Not applicable.    Mitral Valve Not applicable.    Tricuspid Valve Trace tricuspid regurgitation. Estimated RV systolic pressure of 58.4 mmHg +   RA pressure.    Pulmonic Valve Not applicable.    Aorta Not applicable.    Pericardium No pericardial effusion.     Stress  "Test: Date: Results:    ECG Reviewed: Date: 8/4/21 Results:  Normal sinus rhythm   Right superior axis deviation   Right ventricular hypertrophy   Prolonged QT (390/482)  Abnormal ECG   Cath: Date: Results:      METS/Exercise Tolerance: 1 - Eating, dressing Comment: <4   Hematologic:     (+) anemia, history of blood transfusion, no previous transfusion reaction,  (-) history of blood clots   Musculoskeletal: Comment: Partial right foot amputation    Gout right knee  (+) arthritis,     GI/Hepatic:    (-) GERD   Renal/Genitourinary: Comment: Creatinine 3.17, GFR 24 (11/13/21)    (+) renal disease, type: CRI, Pt does not require dialysis,     Endo: Comment: Hypoglycemic episode 8/2021    (+) type II DM, Last HgA1c: 7.2, date: 11/23/21, Not using insulin, - not using insulin pump. Obesity,  (-) thyroid disease   Psychiatric/Substance Use:  - neg psychiatric ROS     Infectious Disease:  - neg infectious disease ROS     Malignancy:  - neg malignancy ROS     Other:  - neg other ROS           /72 (BP Location: Right arm, Patient Position: Chair, Cuff Size: Adult Large)   Pulse 91   Temp 98.4  F (36.9  C) (Oral)   Resp 20   Ht 1.727 m (5' 8\")   Wt 93.4 kg (206 lb)   SpO2 90%   BMI 31.32 kg/m      Physical Exam   Constitutional: Awake, alert, cooperative, no apparent distress, and appears stated age.  Eyes: sclera clear, conjunctiva normal.  HENT: Normocephalic, oral pharynx with moist mucus membranes, dentures. No goiter appreciated.   Respiratory: Clear to auscultation bilaterally, no crackles or wheezing.  Cardiovascular: Regular rate and rhythm, normal S1 and S2, and no murmur noted.  Carotids +2, no bruits. No edema. Palpable pulses to radial arteries.   GI: Normal bowel sounds, soft, non-distended, non-tender, exam limited secondary to body habitus  Lymph/Hematologic: No cervical lymphadenopathy and no supraclavicular lymphadenopathy.  Genitourinary:  defer  Skin: Warm and dry.  No rashes at anticipated " surgical site.   Musculoskeletal: Limited ROM of neck. There is no redness, warmth, or swelling of the joints.   Neurologic: Awake, alert, oriented to name, place and time. Cranial nerves II-XII are grossly intact. Gait is impaired - uses cane  Neuropsychiatric: Calm, cooperative. Normal affect.     Prior Labs/Diagnostic Studies   All labs and imaging personally reviewed     EKG 8/4/21  Normal sinus rhythm   Right superior axis deviation   Right ventricular hypertrophy   Prolonged QT   Abnormal ECG    Echo 2016  Final Conclusion    Limited echo for EF.    Definity was used to enhance endocardial definition secondary to suboptimal image quality.    Normal left ventricular ejection fraction estimated at 55-60%.    Estimated RV systolic pressure of 58.4 mmHg + RA pressure.    No pericardial effusion.    An effusion (pericardial) was seen on the previous echo.  No significant effusion is seen on   today's study.    Estimated EF: 55-60%     The patient's records and results personally reviewed by this provider.     Outside records reviewed from: Care Everywhere    LAB/DIAGNOSTIC STUDIES TODAY:   Results for DANITA KENDALL (MRN [id number]) as of 1/25/2022 07:20   Ref. Range 1/24/2022 16:19   Sodium Latest Ref Range: 133 - 144 mmol/L 138   Potassium Latest Ref Range: 3.4 - 5.3 mmol/L 4.1   Chloride Latest Ref Range: 94 - 109 mmol/L 103   Carbon Dioxide Latest Ref Range: 20 - 32 mmol/L 23   Urea Nitrogen Latest Ref Range: 7 - 30 mg/dL 93 (H)   Creatinine Latest Ref Range: 0.66 - 1.25 mg/dL 2.46 (H)   GFR Estimate Latest Ref Range: >60 mL/min/1.73m2 28 (L)   Calcium Latest Ref Range: 8.5 - 10.1 mg/dL 8.9   Anion Gap Latest Ref Range: 3 - 14 mmol/L 12   Glucose Latest Ref Range: 70 - 99 mg/dL 192 (H)   INR Latest Ref Range: 0.85 - 1.15  2.40 (H)   Results for DANITA KENDALL (MRN [id number]) as of 1/25/2022 07:20   Ref. Range 1/24/2022 16:19   Sodium Latest Ref Range: 133 - 144 mmol/L 138   Potassium Latest Ref Range:  3.4 - 5.3 mmol/L 4.1   Chloride Latest Ref Range: 94 - 109 mmol/L 103   Carbon Dioxide Latest Ref Range: 20 - 32 mmol/L 23   Urea Nitrogen Latest Ref Range: 7 - 30 mg/dL 93 (H)   Creatinine Latest Ref Range: 0.66 - 1.25 mg/dL 2.46 (H)   GFR Estimate Latest Ref Range: >60 mL/min/1.73m2 28 (L)   Calcium Latest Ref Range: 8.5 - 10.1 mg/dL 8.9   Anion Gap Latest Ref Range: 3 - 14 mmol/L 12   Glucose Latest Ref Range: 70 - 99 mg/dL 192 (H)   INR Latest Ref Range: 0.85 - 1.15  2.40 (H)   Patient's INR at goal between 2.0-3.0. Creatinine remains elevated.         Assessment      Nicholas Sultana is a 66 year old male was seen as a PAC referral for risk assessment and optimization for anesthesia.    Plan/Recommendations  Pt will be optimized for the proposed procedure.  See below for details on the assessment, risk, and preoperative recommendations    NEUROLOGY  - No history of TIA, CVA or seizure  -Post Op delirium risk factors:  No risk identified    ENT  - No current airway concerns.  Will need to be reassessed day of surgery.    CARDIAC  - CAD- s/p CABG 5V in 2016. Continue ASA 81 mg  - CHF  With ischemic cardiomyopathy - With preserved EF - Echo in 2016 shows EF 55-60%. Continue bumex.   - Afib  [id number] score:  5 - the patient is on coumadin and may continue for procedure.   - Hypertension  Well controlled - continue imdur, metoprolol.   - Hyperlipidemia  Well controlled on home regimen - continue lipitor.   ~ RVSP was 58.4 mmHg on echo in 2016. The patient denies any new cardiac symptoms with edema, shortness of breath or chest pain.   - METS (Metabolic Equivalents)  Patient CANNOT perform 4 METS exercise without symptoms  Total Score: 1           Functional Capacity: Unable to complete 4 METS      RCRI-High risk: Class 4  >11% complication reate  Total Score: 4           RCRI: Cardiac Risks     RCRI: CAD    RCRI: CHF    RCRI: Elevated Creatinine       For low risk procedure the patient is okay to proceed. He  is due for follow up with outside cardiologist and echo on 2/2022.     PULMONARY  - Obstructive Sleep Apnea  YAS without home CPAP use. consideration for close monitoring of airway.   YAS Low Risk  Total Score: 2           YSA: Over 50 ys old    YAS: Male      - COPD  Not on home oxygen - the patient was seen by pulmonologist in 2016 and was given albuterol and spiriva but reports he doesn't use it because he doesn't have symptoms. The patient was initially 90% on RA and recheck was 93-97% on RA sitting up and 93% laying flat.   - Tobacco History      History   Smoking Status     Former Smoker     Packs/day: 1.50     Years: 15.00     Types: Cigarettes     Quit date: 1/30/2013   Smokeless Tobacco     Never Used       GI  PONV Low Risk  Total Score: 1           1 AN PONV: Patient is not a current smoker        /RENAL  - Baseline Creatinine  2.53 on 11/23/21. Will recheck kidney function today. Consideration for close monitoring of fluid status and avoid nephrotoxins.   ~ ED - hold viagra 24 hours prior.     ENDOCRINE  - BMI: Body mass index is 31.32 kg/m .  Obesity (BMI >30)  - Diabetes  Diabetes Mellitus, Type 2, non-insulin dependent.  Hold morning oral hypoglycemic medications. Recommend close monitoring of the patient's blood glucose levels throughout the perioperative period. Last A1c was 7.2 on 11/23/21.   ~ Gout - left knee - consideration for careful positioning     HEME  VTE Low Risk 0.5%  Total Score: 3           VTE: Greater than 59 yrs old    VTE: Male    ~ Anemia - hgb was 12.5 on 11/23/21. Low bleeding risk procedure.     MSK  ~ s/p partial right foot amputation - the patient uses a cane. Fall precautions as indicated.     ANESTHESIA  - the patient had MAC anesthesia on 11/26/21 without issues. Given all of the patient's co-morbidities after discussion with Dr. Hood have just sent a message to Dr. Dmitri Larson to make sure he's okay to proceed as the ASC. Currently is able to lay flat and has no other  exclusion criteria. ** [place] from Dr. Larson and the patient is okay to proceed at the ASC**    The patient is optimized for their procedure. AVS with information on surgery time/arrival time, meds and NPO status given by nursing staff.        The patient was discussed with Dr. Hood.     On the day of service:     Prep time: 19 minutes  Visit time: 18 minutes  Documentation time: 31 minutes  ------------------------------------------  Total time: 68 minutes      Blanca Swenson PA-C  Preoperative Assessment Center  [facility]  Clinic and [facility] Center  Phone: [phone]  Fax: [phone]

## 2022-01-25 LAB — SARS-COV-2 RNA RESP QL NAA+PROBE: NEGATIVE

## 2022-01-27 ENCOUNTER — ANESTHESIA (OUTPATIENT)
Dept: SURGERY | Facility: AMBULATORY SURGERY CENTER | Age: 67
End: 2022-01-27
Payer: COMMERCIAL

## 2022-01-27 ENCOUNTER — HOSPITAL ENCOUNTER (OUTPATIENT)
Facility: AMBULATORY SURGERY CENTER | Age: 67
End: 2022-01-27
Attending: OPHTHALMOLOGY
Payer: COMMERCIAL

## 2022-01-27 VITALS
WEIGHT: 206 LBS | DIASTOLIC BLOOD PRESSURE: 62 MMHG | HEART RATE: 94 BPM | SYSTOLIC BLOOD PRESSURE: 135 MMHG | TEMPERATURE: 97.5 F | RESPIRATION RATE: 17 BRPM | OXYGEN SATURATION: 93 % | HEIGHT: 68 IN | BODY MASS INDEX: 31.22 KG/M2

## 2022-01-27 DIAGNOSIS — H26.491 OTHER SECONDARY CATARACT, RIGHT EYE: ICD-10-CM

## 2022-01-27 LAB
GLUCOSE BLDC GLUCOMTR-MCNC: 141 MG/DL (ref 70–99)
GLUCOSE BLDC GLUCOMTR-MCNC: 74 MG/DL (ref 70–99)
GLUCOSE BLDC GLUCOMTR-MCNC: 80 MG/DL (ref 70–99)
GLUCOSE BLDC GLUCOMTR-MCNC: 83 MG/DL (ref 70–99)

## 2022-01-27 PROCEDURE — 82962 GLUCOSE BLOOD TEST: CPT | Mod: 91 | Performed by: PATHOLOGY

## 2022-01-27 PROCEDURE — 66982 XCAPSL CTRC RMVL CPLX WO ECP: CPT | Mod: 79 | Performed by: OPHTHALMOLOGY

## 2022-01-27 PROCEDURE — 66982 XCAPSL CTRC RMVL CPLX WO ECP: CPT | Mod: RT

## 2022-01-27 DEVICE — IMPLANTABLE DEVICE: Type: IMPLANTABLE DEVICE | Site: EYE | Status: FUNCTIONAL

## 2022-01-27 RX ORDER — SODIUM CHLORIDE, SODIUM LACTATE, POTASSIUM CHLORIDE, CALCIUM CHLORIDE 600; 310; 30; 20 MG/100ML; MG/100ML; MG/100ML; MG/100ML
INJECTION, SOLUTION INTRAVENOUS CONTINUOUS
Status: DISCONTINUED | OUTPATIENT
Start: 2022-01-27 | End: 2022-01-27 | Stop reason: HOSPADM

## 2022-01-27 RX ORDER — FENTANYL CITRATE 50 UG/ML
25 INJECTION, SOLUTION INTRAMUSCULAR; INTRAVENOUS EVERY 5 MIN PRN
Status: DISCONTINUED | OUTPATIENT
Start: 2022-01-27 | End: 2022-01-27 | Stop reason: HOSPADM

## 2022-01-27 RX ORDER — ONDANSETRON 4 MG/1
4 TABLET, ORALLY DISINTEGRATING ORAL EVERY 30 MIN PRN
Status: DISCONTINUED | OUTPATIENT
Start: 2022-01-27 | End: 2022-01-28 | Stop reason: HOSPADM

## 2022-01-27 RX ORDER — LIDOCAINE 40 MG/G
CREAM TOPICAL
Status: DISCONTINUED | OUTPATIENT
Start: 2022-01-27 | End: 2022-01-27 | Stop reason: HOSPADM

## 2022-01-27 RX ORDER — OXYCODONE HYDROCHLORIDE 5 MG/1
5 TABLET ORAL EVERY 4 HOURS PRN
Status: DISCONTINUED | OUTPATIENT
Start: 2022-01-27 | End: 2022-01-28 | Stop reason: HOSPADM

## 2022-01-27 RX ORDER — HYDROMORPHONE HYDROCHLORIDE 1 MG/ML
0.2 INJECTION, SOLUTION INTRAMUSCULAR; INTRAVENOUS; SUBCUTANEOUS EVERY 5 MIN PRN
Status: DISCONTINUED | OUTPATIENT
Start: 2022-01-27 | End: 2022-01-27 | Stop reason: HOSPADM

## 2022-01-27 RX ORDER — FENTANYL CITRATE 50 UG/ML
INJECTION, SOLUTION INTRAMUSCULAR; INTRAVENOUS PRN
Status: DISCONTINUED | OUTPATIENT
Start: 2022-01-27 | End: 2022-01-27

## 2022-01-27 RX ORDER — SODIUM CHLORIDE, SODIUM LACTATE, POTASSIUM CHLORIDE, CALCIUM CHLORIDE 600; 310; 30; 20 MG/100ML; MG/100ML; MG/100ML; MG/100ML
INJECTION, SOLUTION INTRAVENOUS CONTINUOUS
Status: DISCONTINUED | OUTPATIENT
Start: 2022-01-27 | End: 2022-01-28 | Stop reason: HOSPADM

## 2022-01-27 RX ORDER — MEPERIDINE HYDROCHLORIDE 25 MG/ML
12.5 INJECTION INTRAMUSCULAR; INTRAVENOUS; SUBCUTANEOUS
Status: DISCONTINUED | OUTPATIENT
Start: 2022-01-27 | End: 2022-01-28 | Stop reason: HOSPADM

## 2022-01-27 RX ORDER — BALANCED SALT SOLUTION 6.4; .75; .48; .3; 3.9; 1.7 MG/ML; MG/ML; MG/ML; MG/ML; MG/ML; MG/ML
SOLUTION OPHTHALMIC PRN
Status: DISCONTINUED | OUTPATIENT
Start: 2022-01-27 | End: 2022-01-27 | Stop reason: HOSPADM

## 2022-01-27 RX ORDER — TETRACAINE HYDROCHLORIDE 5 MG/ML
SOLUTION OPHTHALMIC PRN
Status: DISCONTINUED | OUTPATIENT
Start: 2022-01-27 | End: 2022-01-27 | Stop reason: HOSPADM

## 2022-01-27 RX ORDER — FENTANYL CITRATE 50 UG/ML
25 INJECTION, SOLUTION INTRAMUSCULAR; INTRAVENOUS
Status: DISCONTINUED | OUTPATIENT
Start: 2022-01-27 | End: 2022-01-28 | Stop reason: HOSPADM

## 2022-01-27 RX ORDER — PROPARACAINE HYDROCHLORIDE 5 MG/ML
1 SOLUTION/ DROPS OPHTHALMIC ONCE
Status: COMPLETED | OUTPATIENT
Start: 2022-01-27 | End: 2022-01-27

## 2022-01-27 RX ORDER — ONDANSETRON 2 MG/ML
4 INJECTION INTRAMUSCULAR; INTRAVENOUS EVERY 30 MIN PRN
Status: DISCONTINUED | OUTPATIENT
Start: 2022-01-27 | End: 2022-01-27 | Stop reason: HOSPADM

## 2022-01-27 RX ORDER — ACETAMINOPHEN 325 MG/1
975 TABLET ORAL ONCE
Status: COMPLETED | OUTPATIENT
Start: 2022-01-27 | End: 2022-01-27

## 2022-01-27 RX ORDER — OFLOXACIN 3 MG/ML
1 SOLUTION/ DROPS OPHTHALMIC
Status: COMPLETED | OUTPATIENT
Start: 2022-01-27 | End: 2022-01-27

## 2022-01-27 RX ORDER — ONDANSETRON 2 MG/ML
4 INJECTION INTRAMUSCULAR; INTRAVENOUS EVERY 30 MIN PRN
Status: DISCONTINUED | OUTPATIENT
Start: 2022-01-27 | End: 2022-01-28 | Stop reason: HOSPADM

## 2022-01-27 RX ORDER — LIDOCAINE HYDROCHLORIDE 10 MG/ML
INJECTION, SOLUTION EPIDURAL; INFILTRATION; INTRACAUDAL; PERINEURAL PRN
Status: DISCONTINUED | OUTPATIENT
Start: 2022-01-27 | End: 2022-01-27 | Stop reason: HOSPADM

## 2022-01-27 RX ORDER — PREDNISOLONE ACETATE 1 %
SUSPENSION, DROPS(FINAL DOSAGE FORM)(ML) OPHTHALMIC (EYE) PRN
Status: DISCONTINUED | OUTPATIENT
Start: 2022-01-27 | End: 2022-01-27 | Stop reason: HOSPADM

## 2022-01-27 RX ORDER — ONDANSETRON 4 MG/1
4 TABLET, ORALLY DISINTEGRATING ORAL EVERY 30 MIN PRN
Status: DISCONTINUED | OUTPATIENT
Start: 2022-01-27 | End: 2022-01-27 | Stop reason: HOSPADM

## 2022-01-27 RX ADMIN — PROPARACAINE HYDROCHLORIDE 1 DROP: 5 SOLUTION/ DROPS OPHTHALMIC at 10:39

## 2022-01-27 RX ADMIN — OFLOXACIN 1 DROP: 3 SOLUTION/ DROPS OPHTHALMIC at 10:39

## 2022-01-27 RX ADMIN — FENTANYL CITRATE 25 MCG: 50 INJECTION, SOLUTION INTRAMUSCULAR; INTRAVENOUS at 13:13

## 2022-01-27 RX ADMIN — OFLOXACIN 1 DROP: 3 SOLUTION/ DROPS OPHTHALMIC at 10:59

## 2022-01-27 RX ADMIN — ACETAMINOPHEN 975 MG: 325 TABLET ORAL at 10:56

## 2022-01-27 RX ADMIN — SODIUM CHLORIDE, SODIUM LACTATE, POTASSIUM CHLORIDE, CALCIUM CHLORIDE: 600; 310; 30; 20 INJECTION, SOLUTION INTRAVENOUS at 10:58

## 2022-01-27 RX ADMIN — OFLOXACIN 1 DROP: 3 SOLUTION/ DROPS OPHTHALMIC at 10:54

## 2022-01-27 ASSESSMENT — MIFFLIN-ST. JEOR: SCORE: 1688.91

## 2022-01-27 NOTE — OP NOTE
Pre-operative Diagnosis: Dense nuclear sclerotic cataract right eye; cataract s/p retina surgery right eye; poor dilation right eye    Post-operative Diagnosis: CDense nuclear sclerotic cataract right eye; cataract s/p retina surgery right eye; poor dilation right eye; proliferative diabetic retinopathy    Procedure: Complex phacoemulsification cataract extraction with intraocular lens insertion right eye    Surgeon: Ebony Kwok MD    Estimated Blood Loss: 0mL    Specimens: None    Implant: [id number] +19.5D    SN [id number]    The patient was brought into the Operating Room where an intravenous line was started and EKG monitor leads were placed.  Local anesthesia was achieved using tetracaine drops in the right eye.  The right eye was prepped and draped in the usual sterile manner for ocular surgery including the use of 5% Betadine irrigation of the conjunctival fornices.  A Elise speculum was inserted.  There was noted to be minimal dilation and the decision was made to place a Malyugin ring. A Supersharp blade was used to create a paracentesis.  Next, 0.5 mL of lidocaine was instilled in the anterior chamber followed by Viscoat.  A 2.6 mm keratome was used to fashion a triplanar main incision. A Malyugin ring was placed without complication. The iris was noted to be very thin and friable. There was noted to be a very dense cataract with poor red reflex. The Viscoat was rinsed with balanced salt solution and an air bubble was instilled in the anterior chamber. Trypan blue was used to stain the anterior capsule, then rinsed with balanced salt solution, and the anterior chamber was filled with Viscoat. A 360 degree continuous curvilinear capsulorrhexis was fashioned with a cystotome and Utrata forceps.  Hydrodissection was performed using balanced salt solution on a 30 gauge cannula. Phacoemulsification was performed.  Residual cortical material was removed using the irrigation and aspiration handpiece.  There  were two small defects noted in the posterior capsule and it was not felt to provide adequate support for a lens, so the decision was made to place a sulcus lens. Healon was used to expand the sulcus. The lens was inspected and found to be without flaw. The [id number] +19.5D implant was  delivered into the sulcus, and was centered well. Optic capture was achieved. The Malyugin ring was removed. There was noted to be a small area of hemorrhage from the superior iris which stopped with balanced salt solution irrigation. Viscoelastic was removed with the irrigation/aspiration handpiece. A single 10-0 nylon suture was placed at the main incision and buried. The anterior chamber was reformed with balanced salt solution  The wound and paracentesis were hydrated.  All incisions were tested and found to be watertight.  Ofloxacin, ketorolac, and prednisolone drops were placed on the eye.  The speculum and drapes were removed.  The eye was cleaned and a shield was put in place.  The patient tolerated the procedure well and left the Operating Room in good condition.

## 2022-01-27 NOTE — BRIEF OP NOTE
[facility] Brief Operative Note    Pre-operative diagnosis: Other secondary cataract, right eye [H26.491]   Post-operative diagnosis Dense nuclear sclerotic cataract right eye; poor dilation right eye; proliferative diabetic retinopathy right eye; history of retina surgery right eye   Procedure: Procedure(s):  RIGHT EYE PHACOEMULSIFICATION, Complex CATARACT, WITH STANDARD INTRAOCULAR LENS IMPLANT INSERTION   Surgeon(s): Surgeon(s) and Role:     * Ebony Kwok MD - Primary   Estimated blood loss: 0 mL    Specimens: None   Findings: See full operative report

## 2022-01-27 NOTE — DISCHARGE INSTRUCTIONS
[facility]  Home Care Following Anesthesia  For 24 hours after surgery:  1. Get plenty of rest.  A responsible adult must stay with you for at least 24 hours after you leave the surgery center.  2. Do not drive or use heavy equipment.  If you have weakness or tingling, don't drive or use heavy equipment until this feeling goes away.   3. Do not drink alcohol.   4. Avoid strenuous or risky activities.  Ask for help when climbing stairs.  5. You may feel lightheaded.  IF so, sit for a few minutes before standing.  Have someone help you get up.   6. If you have nausea (feel sick to your stomach): Drink only clear liquids such as apple juice, ginger ale, broth or 7-Up.  Rest may also help.  Be sure to drink enough fluids.  Move to a regular diet as you feel able.   7. You may have a slight fever.  Call the doctor if your fever is over 100 F (37.7 C) (taken under the tongue) or lasts longer than 24 hours.  8. You may have a dry mouth, a sore throat, muscle aches or trouble sleeping. These should go away after 24 hours.  9. Do not make important or legal decisions.   10. It is recommended to avoid smoking.               Tips for taking pain medications  To get the best pain relief possible, remember these points:    Take pain medications as directed, before pain becomes severe.    Pain medication can upset your stomach: taking it with food may help.    Constipation is a common side effect of pain medication. Drink plenty of  fluids.    Eat foods high in fiber. Take a stool softener if recommended by your doctor or pharmacist.    Do not drink alcohol, drive or operate machinery while taking pain medications.    Ask about other ways to control pain, such as with heat, ice or relaxation.    Tylenol/Acetaminophen Consumption  To help encourage the safe use of acetaminophen, the makers of TYLENOL  have lowered the maximum daily dose for single-ingredient Extra Strength TYLENOL   (acetaminophen) products sold in the [place] from 8 pills per day (4,000 mg) to 6 pills per day (3,000 mg). The dosing interval has also changed from 2 pills every 4-6 hours to 2 pills every 6 hours.    If you feel your pain relief is insufficient, you may take Tylenol/Acetaminophen in addition to your narcotic pain medication.     Be careful not to exceed 3,000 mg of Tylenol/Acetaminophen in a 24 hour period from all sources.    If you are taking extra strength Tylenol/acetaminophen (500 mg), the maximum dose is 6 tablets in 24 hours.    If you are taking regular strength acetaminophen (325 mg), the maximum dose is 9 tablets in 24 hours.    Call a doctor for any of the followin. Signs of infection (fever, growing tenderness at the surgery site, a large amount of drainage or bleeding, severe pain, foul-smelling drainage, redness, swelling).  2. It has been over 8 to 10 hours since surgery and you are still not able to urinate (pass water).  3. Headache for over 24 hours.  4. Numbness, tingling or weakness the day after surgery (if you had spinal anesthesia).  5. Signs of Covid-19 infection (temperature over 100 degrees, shortness of breath, cough, loss of taste/smell, generalized body aches, persistent headache, chills, sore throat, nausea/vomiting/diarrhea)  Your doctor is:       Dr. Ebony Kwok, Ophthalmology: [phone]               Or dial [phone] and ask for the resident on call for:  Ophthalmology  For emergency care, call the:  [place]:  [phone] (TTY for hearing impaired: [phone])

## 2022-01-27 NOTE — ANESTHESIA CARE TRANSFER NOTE
Patient: Nicholas Sultana    Procedure: Procedure(s):  RIGHT EYE PHACOEMULSIFICATION, Complex CATARACT, WITH STANDARD INTRAOCULAR LENS IMPLANT INSERTION       Diagnosis: Other secondary cataract, right eye [H26.491]  Diagnosis Additional Information: No value filed.    Anesthesia Type:   MAC     Note:  Anesthesia Care Transfer Notewriter  Vitals:  Vitals Value Taken Time   /62 01/27/22 1444   Temp 36.4  C (97.5  F) 01/27/22 1444   Pulse     Resp 17 01/27/22 1444   SpO2 93 % 01/27/22 1444       Electronically Signed By: KATHERINE Merida CRNA  January 27, 2022  3:40 PM

## 2022-01-27 NOTE — ANESTHESIA CARE TRANSFER NOTE
Patient: Nicholas Sultana    Procedure: Procedure(s):  RIGHT EYE PHACOEMULSIFICATION, Complex CATARACT, WITH STANDARD INTRAOCULAR LENS IMPLANT INSERTION       Diagnosis: Other secondary cataract, right eye [H26.491]  Diagnosis Additional Information: No value filed.    Anesthesia Type:   MAC     Note:    Oropharynx: oropharynx clear of all foreign objects  Level of Consciousness: awake  Oxygen Supplementation: room air    Independent Airway: airway patency satisfactory and stable  Dentition: dentition unchanged  Vital Signs Stable: post-procedure vital signs reviewed and stable  Report to RN Given: handoff report given  Patient transferred to: Phase II    Handoff Report: Identifed the Patient, Identified the Reponsible Provider, Reviewed the pertinent medical history, Discussed the surgical course, Reviewed Intra-OP anesthesia mangement and issues during anesthesia, Set expectations for post-procedure period and Allowed opportunity for questions and acknowledgement of understanding      Vitals:  Vitals Value Taken Time   /62 01/27/22 1444   Temp 36.4  C (97.5  F) 01/27/22 1444   Pulse     Resp 17 01/27/22 1444   SpO2 93 % 01/27/22 1444       Electronically Signed By: Beto Moscoso MD  January 27, 2022  3:05 PM

## 2022-01-27 NOTE — PROGRESS NOTES
HPI:  Nicholas Sultana is a 66 year old male presenting for s/p CE/sulcus IOL right eye 1/27/22    Follows with Dr. Concepcion for PDR each eye s/p PPV/Avastin/EL right eye 11/26/21 for NCVH. NLP left eye with NVG.    Complex cataract requiring Malyugin ring, Trypan staining, dense cataract, multiple posterior capsule defects noted with inadequate support for lens in bag and therefore sulcus lens placed.    No eye pain. Vision is brighter right eye but still foggy. No left eye pain. Started drops last night.  Using:  latanoprost at bedtime right eye   Ketorolac QID right eye  PF QID right eye  Ofloxacin QID OD    Past Ocular History:  - PDR each eye s/p PRP each eye (per patient) 2014  - Left eye NLP with NVG with reported history of NCVH 2014 and recommended surgery but never followed; lost vision to NLP prior to presentation to [facility] 11/2021  - right eye PPV/Avastin/EL 11/26/21 for NCVH  - TRD right eye  - cataract left eye  - right eye CE/sulcus IOL 1/27/22    PMH:  DM2 diagnosed 2009   HTN  CKD  Hypercholesterolemia    SH:  Quit smoking Jan 2013.     FH:  No known family history of blindness, glaucoma, macular degeneration    ASSESSMENT and PLAN:  1. Pseudophakia, right eye  - complex surgery with very dense cataract, minimal dilation, limited posterior view and prior PPV for NCVH; required Malyugin ring, trypan staining; noted to have posterior capsule defects and inadequate support for lens in bag so sulcus lens was placed  - noted to have iris hemorrhage with Malyugin ring but hemorrhage stopped with BSS rinsing  - doing well POD1 with VA 20/300, IOP 10, no active iris hemorrhage with fine superior NVI and blot hemorrhages on iris nasal and inf, nasal and temp TIDs, stable sulcus IOL, cornea S- with suture at main buried, 4+ AC pigment and 2+ cell  - discussed guarded visual prognosis in setting of severe PDR, multiple surgeries, neovascularization    - reviewed postop and return precautions including no  swimming/tub baths/saunas, no eye rubbing, shield at night; he understands to call/come in for decreased vision, increased pain, increased redness, purulent discharge, new flashes/floaters etc   --> PF to q2h right eye  --> ofloxacin QID right eye  --> ketorolac QID right eye    Follow up in 1 week with dilation right eye; to see Dr. Bautista same day    2. Proliferative diabetic retinopathy of both eyes associated with type 2 diabetes mellitus, macular edema presence unspecified (H)  - DM2 with PDR each eye s/p PRP 2014 with subsequent NCVH each eye  - last A1c 7.2 in Nov 2021  - left eye previously recommended vitrectomy for NCVH but patient deferred; subsequently lost vision (NLP on presentation to [facility] in 2021)  - presented to [facility] 11/2021 with NCVH right eye now s/p PPV/Avastin/EL 11/26/21 with Dr. Concepcion  - following with Dr. Concepcion    3. NVG (neovascular glaucoma), left, severe stage  - NLP left eye without pain with severe NVG  - dense cataract left eye; discussed with patient would not expect improvement in vision and he understands  - discussed if developing pain would address left eye but given NLP and comfortable will monitor only for now  - monitor      Follow up 1 week with dilation right eye or sooner PRN        -----------------------------------------------------------------------------------    Attestation:  Complete documentation of historical and exam elements from today's encounter can be found in the full encounter summary report (not reduplicated in this progress note). I personally obtained the chief complaint(s) and history of present illness.  I confirmed and edited as necessary the review of systems, past medical/surgical history, family history, social history, and examination findings as documented by others; and I examined the patient myself. I personally reviewed the relevant tests, images, and reports as documented above.     I formulated and edited as necessary the assessment  and plan and discussed the findings and management plan with the patient and family.      Ebony Kwok MD

## 2022-01-27 NOTE — ANESTHESIA POSTPROCEDURE EVALUATION
Patient: Nicholas Sultana    Procedure: Procedure(s):  RIGHT EYE PHACOEMULSIFICATION, Complex CATARACT, WITH STANDARD INTRAOCULAR LENS IMPLANT INSERTION       Diagnosis:Other secondary cataract, right eye [H26.491]  Diagnosis Additional Information: No value filed.    Anesthesia Type:  MAC    Note:  Disposition: Outpatient   Postop Pain Control: Uneventful            Sign Out: Well controlled pain   PONV: No   Neuro/Psych: Uneventful            Sign Out: Acceptable/Baseline neuro status   Airway/Respiratory: Uneventful            Sign Out: Acceptable/Baseline resp. status   CV/Hemodynamics: Uneventful            Sign Out: Acceptable CV status   Other NRE: NONE   DID A NON-ROUTINE EVENT OCCUR? No           Last vitals:  Vitals Value Taken Time   /62 01/27/22 1444   Temp 36.4  C (97.5  F) 01/27/22 1444   Pulse     Resp 17 01/27/22 1444   SpO2 93 % 01/27/22 1444       Electronically Signed By: Beto Moscoso MD  January 27, 2022  2:57 PM

## 2022-01-28 ENCOUNTER — OFFICE VISIT (OUTPATIENT)
Dept: OPHTHALMOLOGY | Facility: CLINIC | Age: 67
End: 2022-01-28
Attending: OPHTHALMOLOGY
Payer: COMMERCIAL

## 2022-01-28 DIAGNOSIS — E11.3593 PROLIFERATIVE DIABETIC RETINOPATHY OF BOTH EYES ASSOCIATED WITH TYPE 2 DIABETES MELLITUS, MACULAR EDEMA PRESENCE UNSPECIFIED (H): ICD-10-CM

## 2022-01-28 DIAGNOSIS — H40.52X3 NVG (NEOVASCULAR GLAUCOMA), LEFT, SEVERE STAGE: ICD-10-CM

## 2022-01-28 DIAGNOSIS — Z96.1 PSEUDOPHAKIA, RIGHT EYE: Primary | ICD-10-CM

## 2022-01-28 PROCEDURE — 99024 POSTOP FOLLOW-UP VISIT: CPT | Performed by: OPHTHALMOLOGY

## 2022-01-28 PROCEDURE — G0463 HOSPITAL OUTPT CLINIC VISIT: HCPCS

## 2022-01-28 ASSESSMENT — VISUAL ACUITY
OD_SC: 20/300
OS_SC: NLP
METHOD: SNELLEN - LINEAR

## 2022-01-28 ASSESSMENT — SLIT LAMP EXAM - LIDS
COMMENTS: DERMATOCHALASIS, BROW PTOSIS
COMMENTS: DERMATOCHALASIS, BROW PTOSIS

## 2022-01-28 ASSESSMENT — TONOMETRY: OD_IOP_MMHG: 10

## 2022-01-28 NOTE — PATIENT INSTRUCTIONS
"Drops to the RIGHT eye:  - Prednisolone (pink or white top, SHAKE WELL) - every 2 hours while awake  - ofloxacin (tan top, antibiotic drop) - 4 times a day  - ketorolac (gray top) - 4 times a day  - latanoprost (green top) at bedtime    4 times a day = breakfast, lunch, dinner, bedtime  Wait a couple minutes between drops    The drops may sting when you put them in. You can use regular artificial tears/lubricant drops to help with any stinging (nothing that says \"get the red out\")    Aching/irriation/scratchiness is normal. You can take Tylenol or ibuprofen for mild pain. If having severe or sharp pain, or not better with Tylenol/ibuprofen please call.    I expect your vision to be blurry and gradually improving over the next couple weeks. If vision worsens, eye gets redder, new floaters, flashes in your vision, missing pieces of vision, etc. Please call    Wear your shield at night so you don't rub your eye in your sleep. Do not rub your eye.   It is OK to take a shower. Avoid getting soap/shampoo in your eyes. No baths, no hot tubs, no saunas, no swimming.   "

## 2022-01-28 NOTE — NURSING NOTE
Chief Complaints and History of Present Illnesses   Patient presents with     Post Op (Ophthalmology) Right Eye     Chief Complaint(s) and History of Present Illness(es)     Post Op (Ophthalmology) Right Eye     Laterality: right eye    Treatments tried: eye drops    Pain scale: 0/10              Comments     1 day post op CE/IOL RE  Had good night , no pain   Use 3 different drops qid RE, nephew is helping w drops  Jennifer DUMONT 11:00 AM January 28, 2022

## 2022-02-07 ENCOUNTER — OFFICE VISIT (OUTPATIENT)
Dept: OPHTHALMOLOGY | Facility: CLINIC | Age: 67
End: 2022-02-07
Attending: OPHTHALMOLOGY
Payer: COMMERCIAL

## 2022-02-07 DIAGNOSIS — E11.3593 PROLIFERATIVE DIABETIC RETINOPATHY OF BOTH EYES ASSOCIATED WITH TYPE 2 DIABETES MELLITUS, MACULAR EDEMA PRESENCE UNSPECIFIED (H): ICD-10-CM

## 2022-02-07 DIAGNOSIS — E11.3593 PROLIFERATIVE DIABETIC RETINOPATHY OF BOTH EYES ASSOCIATED WITH TYPE 2 DIABETES MELLITUS, MACULAR EDEMA PRESENCE UNSPECIFIED (H): Primary | ICD-10-CM

## 2022-02-07 DIAGNOSIS — Z96.1 PSEUDOPHAKIA, RIGHT EYE: Primary | ICD-10-CM

## 2022-02-07 DIAGNOSIS — H40.52X3 NVG (NEOVASCULAR GLAUCOMA), LEFT, SEVERE STAGE: ICD-10-CM

## 2022-02-07 PROCEDURE — G0463 HOSPITAL OUTPT CLINIC VISIT: HCPCS | Mod: 25,27

## 2022-02-07 PROCEDURE — 92250 FUNDUS PHOTOGRAPHY W/I&R: CPT | Performed by: OPHTHALMOLOGY

## 2022-02-07 PROCEDURE — 92134 CPTRZ OPH DX IMG PST SGM RTA: CPT | Performed by: OPHTHALMOLOGY

## 2022-02-07 PROCEDURE — G0463 HOSPITAL OUTPT CLINIC VISIT: HCPCS

## 2022-02-07 PROCEDURE — 99024 POSTOP FOLLOW-UP VISIT: CPT | Performed by: OPHTHALMOLOGY

## 2022-02-07 PROCEDURE — 99024 POSTOP FOLLOW-UP VISIT: CPT | Mod: 25 | Performed by: OPHTHALMOLOGY

## 2022-02-07 ASSESSMENT — TONOMETRY
OD_IOP_MMHG: 11
IOP_METHOD: TONOPEN
OD_IOP_MMHG: 11
IOP_METHOD: TONOPEN

## 2022-02-07 ASSESSMENT — VISUAL ACUITY
OD_SC+: +1
METHOD: SNELLEN - LINEAR
OD_SC+: +1
OD_SC: 20/150
OS_SC: NLP
METHOD: SNELLEN - LINEAR
OD_SC: 20/150
OS_SC: NLP

## 2022-02-07 ASSESSMENT — CONF VISUAL FIELD
OD_SUPERIOR_NASAL_RESTRICTION: 3
OS_SUPERIOR_NASAL_RESTRICTION: 1
OS_INFERIOR_NASAL_RESTRICTION: 1
OS_SUPERIOR_TEMPORAL_RESTRICTION: 1
OD_SUPERIOR_NASAL_RESTRICTION: 3
OS_INFERIOR_TEMPORAL_RESTRICTION: 1
OD_INFERIOR_NASAL_RESTRICTION: 3
OD_INFERIOR_NASAL_RESTRICTION: 3
OS_SUPERIOR_NASAL_RESTRICTION: 1
OS_SUPERIOR_TEMPORAL_RESTRICTION: 1
OS_INFERIOR_NASAL_RESTRICTION: 1
OD_SUPERIOR_TEMPORAL_RESTRICTION: 3
OD_INFERIOR_TEMPORAL_RESTRICTION: 3
OD_INFERIOR_TEMPORAL_RESTRICTION: 3
OD_SUPERIOR_TEMPORAL_RESTRICTION: 3
OS_INFERIOR_TEMPORAL_RESTRICTION: 1

## 2022-02-07 ASSESSMENT — SLIT LAMP EXAM - LIDS: COMMENTS: NORMAL

## 2022-02-07 ASSESSMENT — CUP TO DISC RATIO: OD_RATIO: ?0.6

## 2022-02-07 ASSESSMENT — EXTERNAL EXAM - RIGHT EYE: OD_EXAM: NORMAL

## 2022-02-07 NOTE — PROGRESS NOTES
"CC -   Post OP    INTERVAL HISTORY - POM #2.5 OD s/p PPV/MS/Avastin/EL OD 11/26/21 ([facility])  Had recent CE/IOL feels it helped a little  VA is still a little worse than it was prior to VH that required PPV      PMH -   Nicholas Sultana is a 66 year old year-old patient with h/o DM II and PDR OU.  Presented to [facility] 11/13/2021 with new VH OD    Lost vision OS after \"bleeding\". Was advised to have surgery OS in 2014 for NCVH but did not, subsequent vision loss to NLP unsure when.  Had PRP OU in 2014, but did not get eye exam from 2014 until new VH OD in 11/2021  He reports a hx of open bypass surgery in 2016.       PAST OCULAR SURGERY  PRP (laser per patient) OU 2014  PPV/MS/Avastin/EL OD 11/26/21 ([facility])  CE/IOL OD 1/27/22 ([facility])      RETINAL IMAGING:  OCT 2-7-22  OD - mild IRF diffuse & central, ERM temporal,       ASSESSMENT & PLAN    # POM # 2.5  OD   - s/p PPV/EL/Avastin OD 11/26/21 for NCVH   - retina flat, IOP OK   - recent CE/IOL with [facility]   - gtts per [facility]          # PDR with DM II OU   - s/p PRP OU 2014   - NLP OS ?d/t NVG   - NCVH OD 11/2021   - s/p PPV/EL OD 11/26/21      # TRD OD   - mostly extramacular   - stable on U/S 1/4/22      # NVG OS   - NLP   - no pain    # NS OU   - moderate OD   - brunescent OS   - advise CE/IOL OD soon to facilitate DFE & help vision      RTC 1 month DFE OD, OCT OD - 55 degree, Optos OD - get images after DFE    ATTESTATION     Attending Physician Attestation:      Complete documentation of historical and exam elements from today's encounter can be found in the full encounter summary report (not reduplicated in this progress note).  I personally obtained the chief complaint(s) and history of present illness.  I confirmed and edited as necessary the review of systems, past medical/surgical history, family history, social history, and examination findings as documented by others; and I examined the patient myself.  I personally reviewed the relevant tests, images, and reports as " documented above.  I formulated and edited as necessary the assessment and plan and discussed the findings and management plan with the patient and family    Nidhi Concepcion MD, PhD  , Vitreoretinal Surgery  Department of Ophthalmology  [facility]

## 2022-02-07 NOTE — NURSING NOTE
"Chief Complaints and History of Present Illnesses   Patient presents with     Post Op (Ophthalmology) Right Eye     Chief Complaint(s) and History of Present Illness(es)     Post Op (Ophthalmology) Right Eye               Comments     Pt states that he has been compliant with his post op medications. He says that his right eye is still blurry but that it has improved slightly since his procedure. Pt reports that he cannot use the computer or read his phone still however. Pt describes that occasionally he will see a flash in his right eye when he closes his eye- \"doesn't happen too often\".    DM2  BGL: 125, this morning  Lab Results       Component                Value               Date                       A1C                      7.2                 11/23/2021                 A1C                      8.2                 12/12/2007                 A1C                      9.4                 08/15/2006                 A1C                      8.9                 03/06/2006                Harrison Jules OT 9:53 AM February 7, 2022                "

## 2022-02-07 NOTE — PATIENT INSTRUCTIONS
RIGHT EYE DROPS:  - continue latanoprost (green top) at bedtime  - continue ketorolac (gray top) 4 times a day until the bottle runs out  - stop ofloxacin (tan top)  - taper prednisolone (white or pink top, shake well) to 3 times a day for 1 week, then 2 times a day for 1 week, then 1 time a day for 1 week, then stop

## 2022-02-07 NOTE — PROGRESS NOTES
HPI:  Nicholas Sultana is a 66 year old male presenting for s/p CE/sulcus IOL right eye 1/27/22    Follows with Dr. Concepcion for PDR each eye s/p PPV/Avastin/EL right eye 11/26/21 for NCVH. NLP left eye with NVG.    Complex cataract requiring Malyugin ring, Trypan staining, dense cataract, multiple posterior capsule defects noted with inadequate support for lens in bag and therefore sulcus lens placed.    Noting some improvement in vision but still blurry. No pain. Occasional flash right eye. Left eye has been slightly sore for months which he attributes to getting drops at a prior appointment, overall stable. Refuses further drops left eye.  Blood sugar today 125 on waking up. Very early this morning around 2am was 160. Tries to keep under 200.    Using:  latanoprost at bedtime right eye   Ketorolac QID right eye  PF q2h right eye -- using QID  Ofloxacin QID OD    Past Ocular History:  - PDR each eye s/p PRP each eye (per patient) 2014  - Left eye NLP with NVG with reported history of NCVH 2014 and recommended surgery but never followed; lost vision to NLP prior to presentation to [facility] 11/2021  - right eye PPV/Avastin/EL 11/26/21 for NCVH  - TRD right eye  - cataract left eye  - right eye complex CE/sulcus IOL 1/27/22    PMH:  DM2 diagnosed 2009   HTN  CKD  Hypercholesterolemia    SH:  Quit smoking Jan 2013.     FH:  No known family history of blindness, glaucoma, macular degeneration    ASSESSMENT and PLAN:  1. Pseudophakia, right eye  - complex surgery with very dense cataract, minimal dilation, limited posterior view and prior PPV for NCVH; required Malyugin ring, trypan staining; noted to have posterior capsule defects and inadequate support for lens in bag so sulcus lens was placed  - noted to have iris hemorrhage with Malyugin ring but hemorrhage stopped with BSS rinsing  - POD1 VA was 20/300 with IOP 10, no active iris hemorrhage with fine superior NVI and blot hemorrhages on inf and nasal iris, nasal and  temp TIDs, stable sulcus IOL, buried corneal suture, 4+ AC pigment and 2+ cell  - VA improving to 20/150+ with IOP 11, resolved AC cell, stable sulcus lens; improved posterior view with large vit heme and fibrotic band    - discussed guarded visual prognosis in setting of severe PDR, multiple surgeries, neovascularization, intraocular hemorrhage   - seeing Dr. Concepcion today    - reviewed postop and return precautions including no swimming/tub baths/saunas, no eye rubbing, shield at night for 1 more week; he understands to call/come in for decreased vision, increased pain, increased redness, purulent discharge, new flashes/floaters etc   --> PF to TID x1 week, then BID x1 week, then daily x1 week, then stop  --> stop ofloxacin  --> ketorolac QID right eye until bottle runs out  --> continue latanoprost at bedtime right eye     Reviewed postop drops with patient's nephew, Kulwinder, over the phone.    2. Proliferative diabetic retinopathy of both eyes associated with type 2 diabetes mellitus, macular edema presence unspecified (H)  - DM2 with PDR each eye s/p PRP 2014 with subsequent NCVH each eye  - last A1c 7.2 in Nov 2021  - left eye previously recommended vitrectomy for NCVH but patient deferred; subsequently lost vision (NLP on presentation to [facility] in 2021)  - presented to [facility] 11/2021 with NCVH right eye now s/p PPV/Avastin/EL 11/26/21 with Dr. Concepcion  - following with Dr. Concepcion and has appointment today    3. NVG (neovascular glaucoma), left, severe stage  - NLP left eye without pain with severe NVG  - dense cataract left eye; discussed with patient would not expect improvement in vision and he understands  - discussed if developing pain would address left eye but given NLP and comfortable will monitor only for now  - monitor      Follow up 1 month with refraction right eye or sooner PRN        -----------------------------------------------------------------------------------    Attestation:  Complete  documentation of historical and exam elements from today's encounter can be found in the full encounter summary report (not reduplicated in this progress note). I personally obtained the chief complaint(s) and history of present illness.  I confirmed and edited as necessary the review of systems, past medical/surgical history, family history, social history, and examination findings as documented by others; and I examined the patient myself. I personally reviewed the relevant tests, images, and reports as documented above.     I formulated and edited as necessary the assessment and plan and discussed the findings and management plan with the patient and family.      Ebony Kwok MD

## 2022-02-07 NOTE — NURSING NOTE
"Chief Complaints and History of Present Illnesses   Patient presents with     Follow Up     Chief Complaint(s) and History of Present Illness(es)     Follow Up               Comments     Pt states that he has been compliant with his post op medications. He says that his right eye is still blurry but that it has improved slightly since his procedure. Pt reports that he cannot use the computer or read his phone still however. Pt describes that occasionally he will see a flash in his right eye when he closes his eye- \"doesn't happen too often\".    DM2  BGL: 125, this morning  Lab Results       Component                Value               Date                       A1C                      7.2                 11/23/2021                 A1C                      8.2                 12/12/2007                 A1C                      9.4                 08/15/2006                 A1C                      8.9                 03/06/2006                Harrison Jules OT 9:53 AM February 7, 2022                "

## (undated) DEVICE — GLOVE PROTEXIS MICRO 7.5  2D73PM75

## (undated) DEVICE — PACK CATARACT CUSTOM ASC SEY15CPUMC

## (undated) DEVICE — LINEN TOWEL PACK X5 5464

## (undated) DEVICE — GLOVE PROTEXIS MICRO 6.0  2D73PM60

## (undated) DEVICE — EYE KNIFE SLIT XSTAR VISITEC 2.6MM 45DEG 373726

## (undated) DEVICE — EYE SOL BSS 500ML BAG 0065-1795-04

## (undated) DEVICE — TAPE TRANSPORE 1"X1.5YD 1534S-1

## (undated) DEVICE — EYE TIP IRRIGATION & ASPIRATION POLYMER CVD 0.3MM 8065751512

## (undated) DEVICE — EYE CANN IRR 25GA CYSTOTOME 581610

## (undated) DEVICE — EYE KNIFE STILETTO VISITEC 1.1MM ANG 45DEG SIDEPORT 376620

## (undated) DEVICE — EYE PACK CUSTOM ANTERIOR 30DEG TIP CENTURION PPK6682-04

## (undated) DEVICE — EYE DRAPE MICROSCOPE UNIVERSAL (BIOM FULL) 08-MK55140

## (undated) DEVICE — EYE SHIELD PLASTIC

## (undated) DEVICE — EYE CANN SOFT TIP 25GA FOR VALVED SET 8065149530

## (undated) DEVICE — SOL WATER IRRIG 500ML BOTTLE 2F7113

## (undated) DEVICE — PACK VITRECTOMY/RET CUSTOM ASC PQ15VRU12

## (undated) DEVICE — EYE CANN IRR 27GA ANTERIOR CHAMBER 581280

## (undated) DEVICE — EYE NDL RETROBULBAR ATKINSON 25GA 1.5" 581637

## (undated) DEVICE — POSITIONER ARMBOARD FOAM 1PAIR LF FP-ARMB1

## (undated) DEVICE — SU ETHILON 10-0 CS160-6 12" 9000G

## (undated) DEVICE — GLOVE PROTEXIS MICRO 7.0  2D73PM70

## (undated) DEVICE — EYE LENS FLAT VITRECTOMY S5.7010U

## (undated) DEVICE — SU PLAIN 6-0 TG140-8 18" 1735G

## (undated) DEVICE — EYE RING MALYUGIN PUPIL EXPANDER 6.25MM MAL-000-1

## (undated) DEVICE — ESU CORD BIPOLAR GREEN 10-4000

## (undated) DEVICE — SYR 05ML LL W/O NDL

## (undated) DEVICE — EYE PROBE LASER 25GA FLEX RFID 8065751114

## (undated) DEVICE — EYE BLADE SCLERAL MVR 25GA 8065912501

## (undated) DEVICE — EYE SHIELD PLASTIC CLEAR UNIVERSAL K9-6050

## (undated) DEVICE — SYR 01ML LL W/O NDL LATEX FREE 309628

## (undated) DEVICE — EYE PACK 25GA CONSTELLATION 10,000 CPM PPK9380-04

## (undated) DEVICE — DRSG EYE PAD STERILE 1.63X2.63" NON21600

## (undated) DEVICE — STRAP KNEE/BODY 31143004

## (undated) DEVICE — EYE PROBE ESU DIATHERMY DSP 25GA 339.21

## (undated) RX ORDER — PROPOFOL 10 MG/ML
INJECTION, EMULSION INTRAVENOUS
Status: DISPENSED
Start: 2021-11-18

## (undated) RX ORDER — ACETAMINOPHEN 325 MG/1
TABLET ORAL
Status: DISPENSED
Start: 2022-01-27

## (undated) RX ORDER — ONDANSETRON 2 MG/ML
INJECTION INTRAMUSCULAR; INTRAVENOUS
Status: DISPENSED
Start: 2021-11-26

## (undated) RX ORDER — FENTANYL CITRATE 50 UG/ML
INJECTION, SOLUTION INTRAMUSCULAR; INTRAVENOUS
Status: DISPENSED
Start: 2021-11-26

## (undated) RX ORDER — FENTANYL CITRATE 50 UG/ML
INJECTION, SOLUTION INTRAMUSCULAR; INTRAVENOUS
Status: DISPENSED
Start: 2022-01-27

## (undated) RX ORDER — GLYCOPYRROLATE 0.2 MG/ML
INJECTION INTRAMUSCULAR; INTRAVENOUS
Status: DISPENSED
Start: 2021-11-26

## (undated) RX ORDER — CYCLOPENTOLAT/TROPIC/PHENYLEPH 1%-1%-2.5%
DROPS (EA) OPHTHALMIC (EYE)
Status: DISPENSED
Start: 2021-11-26

## (undated) RX ORDER — PROPOFOL 10 MG/ML
INJECTION, EMULSION INTRAVENOUS
Status: DISPENSED
Start: 2021-11-26

## (undated) RX ORDER — PROPARACAINE HYDROCHLORIDE 5 MG/ML
SOLUTION/ DROPS OPHTHALMIC
Status: DISPENSED
Start: 2021-11-26

## (undated) RX ORDER — LIDOCAINE HYDROCHLORIDE 20 MG/ML
INJECTION, SOLUTION EPIDURAL; INFILTRATION; INTRACAUDAL; PERINEURAL
Status: DISPENSED
Start: 2021-11-26